# Patient Record
Sex: MALE | Race: WHITE | NOT HISPANIC OR LATINO | Employment: OTHER | URBAN - METROPOLITAN AREA
[De-identification: names, ages, dates, MRNs, and addresses within clinical notes are randomized per-mention and may not be internally consistent; named-entity substitution may affect disease eponyms.]

---

## 2018-01-22 ENCOUNTER — CONVERSION ENCOUNTER (OUTPATIENT)
Dept: CARDIOLOGY | Facility: CLINIC | Age: 78
End: 2018-01-22
Attending: INTERNAL MEDICINE

## 2018-01-22 ENCOUNTER — CONVERSION ENCOUNTER (OUTPATIENT)
Dept: CARDIOLOGY | Facility: CLINIC | Age: 78
End: 2018-01-22

## 2018-06-06 ENCOUNTER — OFFICE VISIT CONVERTED (OUTPATIENT)
Dept: CARDIOLOGY | Facility: CLINIC | Age: 78
End: 2018-06-06
Attending: INTERNAL MEDICINE

## 2018-09-10 ENCOUNTER — OFFICE VISIT CONVERTED (OUTPATIENT)
Dept: CARDIOLOGY | Facility: CLINIC | Age: 78
End: 2018-09-10
Attending: INTERNAL MEDICINE

## 2018-09-10 ENCOUNTER — CONVERSION ENCOUNTER (OUTPATIENT)
Dept: OTHER | Facility: HOSPITAL | Age: 78
End: 2018-09-10

## 2019-03-11 ENCOUNTER — OFFICE VISIT CONVERTED (OUTPATIENT)
Dept: CARDIOLOGY | Facility: CLINIC | Age: 79
End: 2019-03-11
Attending: INTERNAL MEDICINE

## 2019-09-23 ENCOUNTER — OFFICE VISIT CONVERTED (OUTPATIENT)
Dept: CARDIOLOGY | Facility: CLINIC | Age: 79
End: 2019-09-23
Attending: INTERNAL MEDICINE

## 2019-09-23 ENCOUNTER — CONVERSION ENCOUNTER (OUTPATIENT)
Dept: CARDIOLOGY | Facility: CLINIC | Age: 79
End: 2019-09-23

## 2020-04-30 ENCOUNTER — OFFICE VISIT CONVERTED (OUTPATIENT)
Dept: OTHER | Facility: HOSPITAL | Age: 80
End: 2020-04-30
Attending: INTERNAL MEDICINE

## 2020-05-06 ENCOUNTER — OFFICE VISIT CONVERTED (OUTPATIENT)
Dept: OTHER | Facility: HOSPITAL | Age: 80
End: 2020-05-06
Attending: INTERNAL MEDICINE

## 2020-05-08 ENCOUNTER — HOSPITAL ENCOUNTER (OUTPATIENT)
Dept: UROLOGY | Facility: CLINIC | Age: 80
Discharge: HOME | End: 2020-05-08
Attending: UROLOGY

## 2020-05-08 ENCOUNTER — OFFICE VISIT CONVERTED (OUTPATIENT)
Dept: UROLOGY | Facility: CLINIC | Age: 80
End: 2020-05-08
Attending: UROLOGY

## 2020-05-11 LAB
AMOXICILLIN+CLAV SUSC ISLT: 4
AMOXICILLIN+CLAV SUSC ISLT: 4
AMPICILLIN SUSC ISLT: >=32
AMPICILLIN SUSC ISLT: >=32
AMPICILLIN+SULBAC SUSC ISLT: 16
AMPICILLIN+SULBAC SUSC ISLT: >=32
BACTERIA UR CULT: ABNORMAL
CEFAZOLIN SUSC ISLT: >=64
CEFAZOLIN SUSC ISLT: >=64
CEFEPIME SUSC ISLT: 2
CEFEPIME SUSC ISLT: 8
CEFTAZIDIME SUSC ISLT: 4
CEFTAZIDIME SUSC ISLT: 4
CEFTRIAXONE SUSC ISLT: >=64
CEFTRIAXONE SUSC ISLT: >=64
CEFUROXIME ORAL SUSC ISLT: >=64
CEFUROXIME ORAL SUSC ISLT: >=64
CEFUROXIME PARENTER SUSC ISLT: >=64
CEFUROXIME PARENTER SUSC ISLT: >=64
CIPROFLOXACIN SUSC ISLT: >=4
CIPROFLOXACIN SUSC ISLT: >=4
CONV RECTAL SCREEN FOR FLUOROQUINOLONE RESISTANT ORGANISMS: ABNORMAL
ERTAPENEM SUSC ISLT: <=0.5
ERTAPENEM SUSC ISLT: <=0.5
GENTAMICIN SUSC ISLT: <=1
GENTAMICIN SUSC ISLT: >=16
LEVOFLOXACIN SUSC ISLT: >=8
LEVOFLOXACIN SUSC ISLT: >=8
NITROFURANTOIN SUSC ISLT: <=16
TETRACYCLINE SUSC ISLT: <=1
TETRACYCLINE SUSC ISLT: <=1
TMP SMX SUSC ISLT: <=20
TMP SMX SUSC ISLT: <=20
TOBRAMYCIN SUSC ISLT: <=1
TOBRAMYCIN SUSC ISLT: >=16

## 2020-05-21 ENCOUNTER — OFFICE VISIT CONVERTED (OUTPATIENT)
Dept: OTHER | Facility: HOSPITAL | Age: 80
End: 2020-05-21
Attending: INTERNAL MEDICINE

## 2020-05-22 ENCOUNTER — OFFICE VISIT CONVERTED (OUTPATIENT)
Dept: UROLOGY | Facility: CLINIC | Age: 80
End: 2020-05-22
Attending: UROLOGY

## 2020-05-26 ENCOUNTER — PROCEDURE VISIT CONVERTED (OUTPATIENT)
Dept: UROLOGY | Facility: CLINIC | Age: 80
End: 2020-05-26
Attending: UROLOGY

## 2020-05-26 ENCOUNTER — HOSPITAL ENCOUNTER (OUTPATIENT)
Dept: SURGERY | Facility: CLINIC | Age: 80
Discharge: HOME | End: 2020-05-26
Attending: UROLOGY

## 2020-06-11 ENCOUNTER — OFFICE VISIT CONVERTED (OUTPATIENT)
Dept: OTHER | Facility: HOSPITAL | Age: 80
End: 2020-06-11
Attending: INTERNAL MEDICINE

## 2020-06-18 ENCOUNTER — OFFICE VISIT CONVERTED (OUTPATIENT)
Dept: OTHER | Facility: HOSPITAL | Age: 80
End: 2020-06-18
Attending: INTERNAL MEDICINE

## 2020-07-16 ENCOUNTER — OFFICE VISIT CONVERTED (OUTPATIENT)
Dept: OTHER | Facility: HOSPITAL | Age: 80
End: 2020-07-16
Attending: INTERNAL MEDICINE

## 2020-08-13 ENCOUNTER — OFFICE VISIT CONVERTED (OUTPATIENT)
Dept: OTHER | Facility: HOSPITAL | Age: 80
End: 2020-08-13
Attending: INTERNAL MEDICINE

## 2020-12-11 ENCOUNTER — CONVERSION ENCOUNTER (OUTPATIENT)
Dept: CARDIOLOGY | Facility: CLINIC | Age: 80
End: 2020-12-11

## 2020-12-11 ENCOUNTER — OFFICE VISIT CONVERTED (OUTPATIENT)
Dept: CARDIOLOGY | Facility: CLINIC | Age: 80
End: 2020-12-11
Attending: NURSE PRACTITIONER

## 2021-01-01 ENCOUNTER — HOSPITAL ENCOUNTER (OUTPATIENT)
Dept: ONCOLOGY | Facility: HOSPITAL | Age: 81
Setting detail: INFUSION SERIES
Discharge: HOME | End: 2021-08-02

## 2021-01-01 ENCOUNTER — HOSPITAL ENCOUNTER (EMERGENCY)
Facility: HOSPITAL | Age: 81
Discharge: LEFT BEFORE DISCHARGE | End: 2021-12-28
Attending: EMERGENCY MEDICINE | Admitting: EMERGENCY MEDICINE

## 2021-01-01 ENCOUNTER — APPOINTMENT (OUTPATIENT)
Dept: CT IMAGING | Facility: HOSPITAL | Age: 81
End: 2021-01-01

## 2021-01-01 ENCOUNTER — LAB (OUTPATIENT)
Dept: ONCOLOGY | Facility: HOSPITAL | Age: 81
End: 2021-01-01

## 2021-01-01 ENCOUNTER — HOSPITAL ENCOUNTER (OUTPATIENT)
Dept: CT IMAGING | Facility: HOSPITAL | Age: 81
Discharge: HOME | End: 2021-10-11
Admitting: INTERNAL MEDICINE

## 2021-01-01 ENCOUNTER — HOSPITAL ENCOUNTER (OUTPATIENT)
Dept: NUCLEAR MEDICINE | Facility: HOSPITAL | Age: 81
Discharge: HOME | End: 2021-08-17

## 2021-01-01 ENCOUNTER — OFFICE VISIT (OUTPATIENT)
Dept: CARDIOLOGY | Facility: CLINIC | Age: 81
End: 2021-01-01

## 2021-01-01 ENCOUNTER — HOSPITAL ENCOUNTER (OUTPATIENT)
Dept: ONCOLOGY | Facility: HOSPITAL | Age: 81
Setting detail: INFUSION SERIES
Discharge: HOME | End: 2021-10-25

## 2021-01-01 ENCOUNTER — HOSPITAL ENCOUNTER (OUTPATIENT)
Dept: ONCOLOGY | Facility: HOSPITAL | Age: 81
Setting detail: INFUSION SERIES
Discharge: HOME | End: 2021-12-20

## 2021-01-01 ENCOUNTER — HOSPITAL ENCOUNTER (OUTPATIENT)
Dept: ONCOLOGY | Facility: HOSPITAL | Age: 81
Setting detail: INFUSION SERIES
Discharge: HOME | End: 2021-09-27

## 2021-01-01 ENCOUNTER — OFFICE VISIT (OUTPATIENT)
Dept: ONCOLOGY | Facility: HOSPITAL | Age: 81
End: 2021-01-01

## 2021-01-01 ENCOUNTER — TELEPHONE (OUTPATIENT)
Dept: ONCOLOGY | Facility: HOSPITAL | Age: 81
End: 2021-01-01

## 2021-01-01 ENCOUNTER — HOSPITAL ENCOUNTER (OUTPATIENT)
Dept: ONCOLOGY | Facility: HOSPITAL | Age: 81
Setting detail: INFUSION SERIES
Discharge: HOME | End: 2021-08-30

## 2021-01-01 ENCOUNTER — HOSPITAL ENCOUNTER (OUTPATIENT)
Dept: ONCOLOGY | Facility: HOSPITAL | Age: 81
Setting detail: INFUSION SERIES
Discharge: HOME | End: 2021-11-22

## 2021-01-01 ENCOUNTER — APPOINTMENT (OUTPATIENT)
Dept: GENERAL RADIOLOGY | Facility: HOSPITAL | Age: 81
End: 2021-01-01

## 2021-01-01 ENCOUNTER — APPOINTMENT (OUTPATIENT)
Dept: ONCOLOGY | Facility: HOSPITAL | Age: 81
End: 2021-01-01

## 2021-01-01 ENCOUNTER — TELEPHONE (OUTPATIENT)
Dept: UROLOGY | Facility: CLINIC | Age: 81
End: 2021-01-01

## 2021-01-01 ENCOUNTER — HOSPITAL ENCOUNTER (OUTPATIENT)
Dept: CT IMAGING | Facility: HOSPITAL | Age: 81
Discharge: HOME | End: 2021-08-17

## 2021-01-01 ENCOUNTER — APPOINTMENT (OUTPATIENT)
Dept: LAB | Facility: HOSPITAL | Age: 81
End: 2021-01-01

## 2021-01-01 VITALS
RESPIRATION RATE: 18 BRPM | TEMPERATURE: 97.4 F | SYSTOLIC BLOOD PRESSURE: 128 MMHG | HEART RATE: 100 BPM | DIASTOLIC BLOOD PRESSURE: 78 MMHG

## 2021-01-01 VITALS
BODY MASS INDEX: 23.1 KG/M2 | DIASTOLIC BLOOD PRESSURE: 72 MMHG | WEIGHT: 180 LBS | HEART RATE: 78 BPM | TEMPERATURE: 98.4 F | HEIGHT: 74 IN | SYSTOLIC BLOOD PRESSURE: 123 MMHG

## 2021-01-01 VITALS
HEIGHT: 74 IN | TEMPERATURE: 97.1 F | RESPIRATION RATE: 18 BRPM | BODY MASS INDEX: 27.3 KG/M2 | HEART RATE: 83 BPM | WEIGHT: 212.74 LBS | DIASTOLIC BLOOD PRESSURE: 77 MMHG | SYSTOLIC BLOOD PRESSURE: 111 MMHG | OXYGEN SATURATION: 100 %

## 2021-01-01 VITALS
TEMPERATURE: 98.2 F | DIASTOLIC BLOOD PRESSURE: 69 MMHG | RESPIRATION RATE: 18 BRPM | HEART RATE: 80 BPM | RESPIRATION RATE: 18 BRPM | RESPIRATION RATE: 18 BRPM | BODY MASS INDEX: 27.79 KG/M2 | OXYGEN SATURATION: 98 % | WEIGHT: 201.8 LBS | OXYGEN SATURATION: 98 % | HEIGHT: 74 IN | HEART RATE: 72 BPM | SYSTOLIC BLOOD PRESSURE: 112 MMHG | BODY MASS INDEX: 26.08 KG/M2 | DIASTOLIC BLOOD PRESSURE: 65 MMHG | DIASTOLIC BLOOD PRESSURE: 70 MMHG | OXYGEN SATURATION: 97 % | RESPIRATION RATE: 20 BRPM | HEART RATE: 84 BPM | TEMPERATURE: 98.9 F | TEMPERATURE: 96.9 F | WEIGHT: 205.2 LBS | RESPIRATION RATE: 20 BRPM | WEIGHT: 205.7 LBS | BODY MASS INDEX: 26.34 KG/M2 | OXYGEN SATURATION: 98 % | WEIGHT: 205.2 LBS | HEIGHT: 74 IN | HEART RATE: 81 BPM | HEIGHT: 74 IN | HEIGHT: 74 IN | HEIGHT: 74 IN | BODY MASS INDEX: 26.4 KG/M2 | OXYGEN SATURATION: 98 % | SYSTOLIC BLOOD PRESSURE: 112 MMHG | SYSTOLIC BLOOD PRESSURE: 124 MMHG | BODY MASS INDEX: 26.14 KG/M2 | TEMPERATURE: 97.4 F | HEART RATE: 70 BPM | HEART RATE: 82 BPM | BODY MASS INDEX: 25.9 KG/M2 | WEIGHT: 203.2 LBS | DIASTOLIC BLOOD PRESSURE: 62 MMHG | SYSTOLIC BLOOD PRESSURE: 118 MMHG | TEMPERATURE: 96.3 F | TEMPERATURE: 97 F | DIASTOLIC BLOOD PRESSURE: 64 MMHG | OXYGEN SATURATION: 99 % | DIASTOLIC BLOOD PRESSURE: 62 MMHG | SYSTOLIC BLOOD PRESSURE: 96 MMHG | HEIGHT: 72 IN | WEIGHT: 203.7 LBS | SYSTOLIC BLOOD PRESSURE: 105 MMHG

## 2021-01-01 VITALS
HEART RATE: 49 BPM | DIASTOLIC BLOOD PRESSURE: 64 MMHG | TEMPERATURE: 97.2 F | OXYGEN SATURATION: 99 % | SYSTOLIC BLOOD PRESSURE: 117 MMHG | RESPIRATION RATE: 18 BRPM

## 2021-01-01 VITALS
SYSTOLIC BLOOD PRESSURE: 80 MMHG | HEIGHT: 74 IN | BODY MASS INDEX: 25.28 KG/M2 | WEIGHT: 197 LBS | DIASTOLIC BLOOD PRESSURE: 54 MMHG | HEART RATE: 80 BPM

## 2021-01-01 VITALS — RESPIRATION RATE: 17 BRPM | WEIGHT: 206.25 LBS | BODY MASS INDEX: 25.11 KG/M2 | HEIGHT: 76 IN

## 2021-01-01 VITALS
OXYGEN SATURATION: 98 % | RESPIRATION RATE: 18 BRPM | HEART RATE: 72 BPM | DIASTOLIC BLOOD PRESSURE: 68 MMHG | SYSTOLIC BLOOD PRESSURE: 114 MMHG | TEMPERATURE: 96.7 F

## 2021-01-01 VITALS
OXYGEN SATURATION: 99 % | HEART RATE: 84 BPM | DIASTOLIC BLOOD PRESSURE: 75 MMHG | WEIGHT: 217.15 LBS | TEMPERATURE: 97.5 F | BODY MASS INDEX: 27.88 KG/M2 | RESPIRATION RATE: 18 BRPM | SYSTOLIC BLOOD PRESSURE: 126 MMHG

## 2021-01-01 VITALS
OXYGEN SATURATION: 91 % | HEART RATE: 86 BPM | DIASTOLIC BLOOD PRESSURE: 81 MMHG | SYSTOLIC BLOOD PRESSURE: 107 MMHG | BODY MASS INDEX: 25.67 KG/M2 | HEIGHT: 74 IN | RESPIRATION RATE: 20 BRPM | WEIGHT: 200 LBS | TEMPERATURE: 97.9 F

## 2021-01-01 VITALS
WEIGHT: 212.08 LBS | OXYGEN SATURATION: 99 % | HEART RATE: 87 BPM | TEMPERATURE: 98.7 F | RESPIRATION RATE: 18 BRPM | SYSTOLIC BLOOD PRESSURE: 103 MMHG | DIASTOLIC BLOOD PRESSURE: 71 MMHG | BODY MASS INDEX: 27.23 KG/M2

## 2021-01-01 VITALS
HEIGHT: 74 IN | HEART RATE: 80 BPM | DIASTOLIC BLOOD PRESSURE: 88 MMHG | SYSTOLIC BLOOD PRESSURE: 134 MMHG | WEIGHT: 202 LBS | BODY MASS INDEX: 25.93 KG/M2

## 2021-01-01 VITALS
WEIGHT: 204 LBS | HEIGHT: 74 IN | SYSTOLIC BLOOD PRESSURE: 110 MMHG | HEART RATE: 78 BPM | DIASTOLIC BLOOD PRESSURE: 74 MMHG | BODY MASS INDEX: 26.18 KG/M2

## 2021-01-01 VITALS
SYSTOLIC BLOOD PRESSURE: 104 MMHG | WEIGHT: 210 LBS | DIASTOLIC BLOOD PRESSURE: 64 MMHG | BODY MASS INDEX: 26.95 KG/M2 | HEART RATE: 78 BPM | HEIGHT: 74 IN

## 2021-01-01 VITALS
HEART RATE: 76 BPM | HEIGHT: 74 IN | SYSTOLIC BLOOD PRESSURE: 120 MMHG | DIASTOLIC BLOOD PRESSURE: 80 MMHG | BODY MASS INDEX: 26.44 KG/M2 | WEIGHT: 206 LBS

## 2021-01-01 VITALS
SYSTOLIC BLOOD PRESSURE: 120 MMHG | HEIGHT: 74 IN | DIASTOLIC BLOOD PRESSURE: 74 MMHG | WEIGHT: 203 LBS | HEART RATE: 76 BPM | BODY MASS INDEX: 26.05 KG/M2

## 2021-01-01 VITALS
HEIGHT: 74 IN | WEIGHT: 215 LBS | SYSTOLIC BLOOD PRESSURE: 100 MMHG | BODY MASS INDEX: 27.59 KG/M2 | HEART RATE: 80 BPM | DIASTOLIC BLOOD PRESSURE: 74 MMHG

## 2021-01-01 DIAGNOSIS — C61 PRIMARY MALIGNANT NEOPLASM OF PROSTATE METASTATIC TO BONE (HCC): Primary | ICD-10-CM

## 2021-01-01 DIAGNOSIS — C61 PRIMARY MALIGNANT NEOPLASM OF PROSTATE METASTATIC TO BONE (HCC): ICD-10-CM

## 2021-01-01 DIAGNOSIS — C79.51 PRIMARY MALIGNANT NEOPLASM OF PROSTATE METASTATIC TO BONE (HCC): ICD-10-CM

## 2021-01-01 DIAGNOSIS — J18.9 PNEUMONIA OF RIGHT LUNG DUE TO INFECTIOUS ORGANISM, UNSPECIFIED PART OF LUNG: Primary | ICD-10-CM

## 2021-01-01 DIAGNOSIS — C79.51 PRIMARY MALIGNANT NEOPLASM OF PROSTATE METASTATIC TO BONE (HCC): Primary | ICD-10-CM

## 2021-01-01 DIAGNOSIS — N18.9 ACUTE ON CHRONIC RENAL INSUFFICIENCY: ICD-10-CM

## 2021-01-01 DIAGNOSIS — I25.810 CORONARY ARTERY DISEASE INVOLVING CORONARY BYPASS GRAFT OF NATIVE HEART WITHOUT ANGINA PECTORIS: Primary | Chronic | ICD-10-CM

## 2021-01-01 DIAGNOSIS — I50.42 CHRONIC COMBINED SYSTOLIC AND DIASTOLIC CONGESTIVE HEART FAILURE (HCC): Chronic | ICD-10-CM

## 2021-01-01 DIAGNOSIS — E86.0 DEHYDRATION: ICD-10-CM

## 2021-01-01 DIAGNOSIS — N18.31 STAGE 3A CHRONIC KIDNEY DISEASE (HCC): ICD-10-CM

## 2021-01-01 DIAGNOSIS — N28.9 ACUTE ON CHRONIC RENAL INSUFFICIENCY: ICD-10-CM

## 2021-01-01 DIAGNOSIS — I10 HYPERTENSION, ESSENTIAL: ICD-10-CM

## 2021-01-01 DIAGNOSIS — G89.3 CANCER ASSOCIATED PAIN: Primary | ICD-10-CM

## 2021-01-01 DIAGNOSIS — I42.9 CARDIOMYOPATHY, UNSPECIFIED TYPE (HCC): ICD-10-CM

## 2021-01-01 DIAGNOSIS — J96.01 ACUTE RESPIRATORY FAILURE WITH HYPOXIA: ICD-10-CM

## 2021-01-01 DIAGNOSIS — R89.9 ABNORMAL LABORATORY TEST: ICD-10-CM

## 2021-01-01 DIAGNOSIS — E78.5 HYPERLIPIDEMIA, UNSPECIFIED HYPERLIPIDEMIA TYPE: ICD-10-CM

## 2021-01-01 LAB
ALBUMIN SERPL-MCNC: 4.5 G/DL (ref 3.5–5.2)
ALBUMIN SERPL-MCNC: 4.59 G/DL (ref 3.5–5.2)
ALBUMIN SERPL-MCNC: 4.6 G/DL (ref 3.5–5.2)
ALBUMIN SERPL-MCNC: 4.71 G/DL (ref 3.5–5.2)
ALBUMIN SERPL-MCNC: 4.72 G/DL (ref 3.5–5.2)
ALBUMIN SERPL-MCNC: 4.75 G/DL (ref 3.5–5.2)
ALBUMIN SERPL-MCNC: 4.87 G/DL (ref 3.5–5.2)
ALBUMIN/GLOB SERPL: 1.6 G/DL
ALBUMIN/GLOB SERPL: 1.8 G/DL
ALBUMIN/GLOB SERPL: 2.2 G/DL
ALBUMIN/GLOB SERPL: 2.2 G/DL
ALBUMIN/GLOB SERPL: 2.3 G/DL
ALBUMIN/GLOB SERPL: 2.3 G/DL
ALBUMIN/GLOB SERPL: 2.4 G/DL
ALP SERPL-CCNC: 34 U/L (ref 39–117)
ALP SERPL-CCNC: 35 U/L (ref 39–117)
ALP SERPL-CCNC: 35 U/L (ref 39–117)
ALP SERPL-CCNC: 36 U/L (ref 39–117)
ALP SERPL-CCNC: 39 U/L (ref 39–117)
ALP SERPL-CCNC: 45 U/L (ref 39–117)
ALP SERPL-CCNC: 46 U/L (ref 39–117)
ALT SERPL W P-5'-P-CCNC: 10 U/L (ref 1–41)
ALT SERPL W P-5'-P-CCNC: 18 U/L (ref 1–41)
ALT SERPL W P-5'-P-CCNC: 6 U/L (ref 1–41)
ALT SERPL W P-5'-P-CCNC: 7 U/L (ref 1–41)
ALT SERPL W P-5'-P-CCNC: 8 U/L (ref 1–41)
ALT SERPL W P-5'-P-CCNC: 8 U/L (ref 1–41)
ALT SERPL W P-5'-P-CCNC: 9 U/L (ref 1–41)
ANION GAP SERPL CALCULATED.3IONS-SCNC: 11.3 MMOL/L (ref 5–15)
ANION GAP SERPL CALCULATED.3IONS-SCNC: 11.8 MMOL/L (ref 5–15)
ANION GAP SERPL CALCULATED.3IONS-SCNC: 14.7 MMOL/L (ref 5–15)
ANION GAP SERPL CALCULATED.3IONS-SCNC: 16.2 MMOL/L (ref 5–15)
ANION GAP SERPL CALCULATED.3IONS-SCNC: 8.8 MMOL/L (ref 5–15)
ANION GAP SERPL CALCULATED.3IONS-SCNC: 9 MMOL/L (ref 5–15)
ANION GAP SERPL CALCULATED.3IONS-SCNC: 9.7 MMOL/L (ref 5–15)
APTT PPP: 26.3 SECONDS (ref 22.2–34.2)
ARTERIAL PATENCY WRIST A: POSITIVE
AST SERPL-CCNC: 13 U/L (ref 1–40)
AST SERPL-CCNC: 14 U/L (ref 1–40)
AST SERPL-CCNC: 14 U/L (ref 1–40)
AST SERPL-CCNC: 16 U/L (ref 1–40)
AST SERPL-CCNC: 24 U/L (ref 1–40)
BASE EXCESS BLDA CALC-SCNC: -1.8 MMOL/L (ref -2–2)
BASOPHILS # BLD AUTO: 0.02 10*3/MM3 (ref 0–0.2)
BASOPHILS # BLD AUTO: 0.02 10*3/MM3 (ref 0–0.2)
BASOPHILS # BLD AUTO: 0.03 10*3/MM3 (ref 0–0.2)
BASOPHILS # BLD AUTO: 0.03 10*3/MM3 (ref 0–0.2)
BASOPHILS # BLD AUTO: 0.04 10*3/MM3 (ref 0–0.2)
BASOPHILS # BLD AUTO: 0.04 10*3/MM3 (ref 0–0.2)
BASOPHILS # BLD AUTO: 0.05 10*3/MM3 (ref 0–0.2)
BASOPHILS NFR BLD AUTO: 0.3 % (ref 0–1.5)
BASOPHILS NFR BLD AUTO: 0.3 % (ref 0–1.5)
BASOPHILS NFR BLD AUTO: 0.4 % (ref 0–1.5)
BASOPHILS NFR BLD AUTO: 0.4 % (ref 0–1.5)
BASOPHILS NFR BLD AUTO: 0.6 % (ref 0–1.5)
BDY SITE: ABNORMAL
BILIRUB SERPL-MCNC: 0.7 MG/DL (ref 0–1.2)
BILIRUB SERPL-MCNC: 0.8 MG/DL (ref 0–1.2)
BILIRUB SERPL-MCNC: 0.8 MG/DL (ref 0–1.2)
BILIRUB SERPL-MCNC: 0.9 MG/DL (ref 0–1.2)
BILIRUB SERPL-MCNC: 1.2 MG/DL (ref 0–1.2)
BUN SERPL-MCNC: 15 MG/DL (ref 8–23)
BUN SERPL-MCNC: 18 MG/DL (ref 8–23)
BUN SERPL-MCNC: 22 MG/DL (ref 8–23)
BUN SERPL-MCNC: 24 MG/DL (ref 8–23)
BUN SERPL-MCNC: 24 MG/DL (ref 8–23)
BUN SERPL-MCNC: 26 MG/DL (ref 8–23)
BUN SERPL-MCNC: 28 MG/DL (ref 8–23)
BUN/CREAT SERPL: 10.8 (ref 7–25)
BUN/CREAT SERPL: 12.6 (ref 7–25)
BUN/CREAT SERPL: 12.8 (ref 7–25)
BUN/CREAT SERPL: 12.9 (ref 7–25)
BUN/CREAT SERPL: 16.9 (ref 7–25)
BUN/CREAT SERPL: 18.5 (ref 7–25)
BUN/CREAT SERPL: 18.6 (ref 7–25)
CALCIUM SPEC-SCNC: 10.1 MG/DL (ref 8.6–10.5)
CALCIUM SPEC-SCNC: 9 MG/DL (ref 8.6–10.5)
CALCIUM SPEC-SCNC: 9.2 MG/DL (ref 8.6–10.5)
CALCIUM SPEC-SCNC: 9.5 MG/DL (ref 8.6–10.5)
CALCIUM SPEC-SCNC: 9.5 MG/DL (ref 8.6–10.5)
CALCIUM SPEC-SCNC: 9.7 MG/DL (ref 8.6–10.5)
CALCIUM SPEC-SCNC: 9.7 MG/DL (ref 8.6–10.5)
CHLORIDE SERPL-SCNC: 103 MMOL/L (ref 98–107)
CHLORIDE SERPL-SCNC: 103 MMOL/L (ref 98–107)
CHLORIDE SERPL-SCNC: 104 MMOL/L (ref 98–107)
CHLORIDE SERPL-SCNC: 104 MMOL/L (ref 98–107)
CHLORIDE SERPL-SCNC: 105 MMOL/L (ref 98–107)
CHLORIDE SERPL-SCNC: 105 MMOL/L (ref 98–107)
CHLORIDE SERPL-SCNC: 98 MMOL/L (ref 98–107)
CO2 SERPL-SCNC: 22.2 MMOL/L (ref 22–29)
CO2 SERPL-SCNC: 22.3 MMOL/L (ref 22–29)
CO2 SERPL-SCNC: 22.7 MMOL/L (ref 22–29)
CO2 SERPL-SCNC: 23.3 MMOL/L (ref 22–29)
CO2 SERPL-SCNC: 23.8 MMOL/L (ref 22–29)
CO2 SERPL-SCNC: 25 MMOL/L (ref 22–29)
CO2 SERPL-SCNC: 27.2 MMOL/L (ref 22–29)
COHGB MFR BLD: 0.5 % (ref 0–1.5)
CREAT SERPL-MCNC: 1.29 MG/DL (ref 0.76–1.27)
CREAT SERPL-MCNC: 1.39 MG/DL (ref 0.76–1.27)
CREAT SERPL-MCNC: 1.4 MG/DL (ref 0.76–1.27)
CREAT SERPL-MCNC: 1.51 MG/DL (ref 0.76–1.27)
CREAT SERPL-MCNC: 1.54 MG/DL (ref 0.76–1.27)
CREAT SERPL-MCNC: 1.72 MG/DL (ref 0.76–1.27)
CREAT SERPL-MCNC: 1.91 MG/DL (ref 0.76–1.27)
D-LACTATE SERPL-SCNC: 1.4 MMOL/L (ref 0.5–2)
DEPRECATED RDW RBC AUTO: 47.9 FL (ref 37–54)
DEPRECATED RDW RBC AUTO: 48.5 FL (ref 37–54)
DEPRECATED RDW RBC AUTO: 50.1 FL (ref 37–54)
DEPRECATED RDW RBC AUTO: 51.7 FL (ref 37–54)
DEPRECATED RDW RBC AUTO: 52.1 FL (ref 37–54)
DEPRECATED RDW RBC AUTO: 52.8 FL (ref 37–54)
DEPRECATED RDW RBC AUTO: 53.4 FL (ref 37–54)
EOSINOPHIL # BLD AUTO: 0.12 10*3/MM3 (ref 0–0.4)
EOSINOPHIL # BLD AUTO: 0.13 10*3/MM3 (ref 0–0.4)
EOSINOPHIL # BLD AUTO: 0.2 10*3/MM3 (ref 0–0.4)
EOSINOPHIL # BLD AUTO: 0.2 10*3/MM3 (ref 0–0.4)
EOSINOPHIL # BLD AUTO: 0.23 10*3/MM3 (ref 0–0.4)
EOSINOPHIL # BLD AUTO: 0.24 10*3/MM3 (ref 0–0.4)
EOSINOPHIL # BLD AUTO: 0.26 10*3/MM3 (ref 0–0.4)
EOSINOPHIL NFR BLD AUTO: 1.5 % (ref 0.3–6.2)
EOSINOPHIL NFR BLD AUTO: 1.7 % (ref 0.3–6.2)
EOSINOPHIL NFR BLD AUTO: 2.9 % (ref 0.3–6.2)
EOSINOPHIL NFR BLD AUTO: 3.2 % (ref 0.3–6.2)
EOSINOPHIL NFR BLD AUTO: 3.6 % (ref 0.3–6.2)
EOSINOPHIL NFR BLD AUTO: 3.7 % (ref 0.3–6.2)
EOSINOPHIL NFR BLD AUTO: 3.8 % (ref 0.3–6.2)
ERYTHROCYTE [DISTWIDTH] IN BLOOD BY AUTOMATED COUNT: 14.2 % (ref 12.3–15.4)
ERYTHROCYTE [DISTWIDTH] IN BLOOD BY AUTOMATED COUNT: 14.9 % (ref 12.3–15.4)
ERYTHROCYTE [DISTWIDTH] IN BLOOD BY AUTOMATED COUNT: 15 % (ref 12.3–15.4)
ERYTHROCYTE [DISTWIDTH] IN BLOOD BY AUTOMATED COUNT: 15.8 % (ref 12.3–15.4)
ERYTHROCYTE [DISTWIDTH] IN BLOOD BY AUTOMATED COUNT: 15.9 % (ref 12.3–15.4)
ERYTHROCYTE [DISTWIDTH] IN BLOOD BY AUTOMATED COUNT: 16.3 % (ref 12.3–15.4)
ERYTHROCYTE [DISTWIDTH] IN BLOOD BY AUTOMATED COUNT: 16.8 % (ref 12.3–15.4)
FHHB: 2 % (ref 0–5)
FLUAV AG NPH QL: NEGATIVE
FLUBV AG NPH QL IA: NEGATIVE
GAS FLOW AIRWAY: 2 LPM
GFR SERPL CREATININE-BSD FRML MDRD: 34 ML/MIN/1.73
GFR SERPL CREATININE-BSD FRML MDRD: 38 ML/MIN/1.73
GFR SERPL CREATININE-BSD FRML MDRD: 44 ML/MIN/1.73
GFR SERPL CREATININE-BSD FRML MDRD: 45 ML/MIN/1.73
GFR SERPL CREATININE-BSD FRML MDRD: 49 ML/MIN/1.73
GFR SERPL CREATININE-BSD FRML MDRD: 49 ML/MIN/1.73
GFR SERPL CREATININE-BSD FRML MDRD: 54 ML/MIN/1.73
GLOBULIN UR ELPH-MCNC: 2 GM/DL
GLOBULIN UR ELPH-MCNC: 2.2 GM/DL
GLOBULIN UR ELPH-MCNC: 2.2 GM/DL
GLOBULIN UR ELPH-MCNC: 2.6 GM/DL
GLOBULIN UR ELPH-MCNC: 2.8 GM/DL
GLUCOSE SERPL-MCNC: 102 MG/DL (ref 65–99)
GLUCOSE SERPL-MCNC: 102 MG/DL (ref 65–99)
GLUCOSE SERPL-MCNC: 107 MG/DL (ref 65–99)
GLUCOSE SERPL-MCNC: 109 MG/DL (ref 65–99)
GLUCOSE SERPL-MCNC: 127 MG/DL (ref 65–99)
HCO3 BLDA-SCNC: 21.7 MMOL/L (ref 22–26)
HCT VFR BLD AUTO: 36.7 % (ref 37.5–51)
HCT VFR BLD AUTO: 37 % (ref 37.5–51)
HCT VFR BLD AUTO: 37.6 % (ref 37.5–51)
HCT VFR BLD AUTO: 38.5 % (ref 37.5–51)
HCT VFR BLD AUTO: 39.5 % (ref 37.5–51)
HCT VFR BLD AUTO: 40.6 % (ref 37.5–51)
HCT VFR BLD AUTO: 40.9 % (ref 37.5–51)
HGB BLD-MCNC: 11.8 G/DL (ref 13–17.7)
HGB BLD-MCNC: 11.9 G/DL (ref 13–17.7)
HGB BLD-MCNC: 12 G/DL (ref 13–17.7)
HGB BLD-MCNC: 12.2 G/DL (ref 13–17.7)
HGB BLD-MCNC: 12.9 G/DL (ref 13–17.7)
HGB BLD-MCNC: 13 G/DL (ref 13–17.7)
HGB BLD-MCNC: 13.1 G/DL (ref 13–17.7)
HGB BLDA-MCNC: 13.1 G/DL (ref 13.8–16.4)
HOLD SPECIMEN: NORMAL
HOLD SPECIMEN: NORMAL
IMM GRANULOCYTES # BLD AUTO: 0.01 10*3/MM3 (ref 0–0.05)
IMM GRANULOCYTES # BLD AUTO: 0.02 10*3/MM3 (ref 0–0.05)
IMM GRANULOCYTES # BLD AUTO: 0.03 10*3/MM3 (ref 0–0.05)
IMM GRANULOCYTES # BLD AUTO: 0.03 10*3/MM3 (ref 0–0.05)
IMM GRANULOCYTES NFR BLD AUTO: 0.1 % (ref 0–0.5)
IMM GRANULOCYTES NFR BLD AUTO: 0.2 % (ref 0–0.5)
IMM GRANULOCYTES NFR BLD AUTO: 0.3 % (ref 0–0.5)
IMM GRANULOCYTES NFR BLD AUTO: 0.3 % (ref 0–0.5)
IMM GRANULOCYTES NFR BLD AUTO: 0.4 % (ref 0–0.5)
INHALED O2 CONCENTRATION: 28 %
INR PPP: 1.22 (ref 2–3)
LYMPHOCYTES # BLD AUTO: 1.28 10*3/MM3 (ref 0.7–3.1)
LYMPHOCYTES # BLD AUTO: 1.31 10*3/MM3 (ref 0.7–3.1)
LYMPHOCYTES # BLD AUTO: 1.34 10*3/MM3 (ref 0.7–3.1)
LYMPHOCYTES # BLD AUTO: 1.35 10*3/MM3 (ref 0.7–3.1)
LYMPHOCYTES # BLD AUTO: 1.38 10*3/MM3 (ref 0.7–3.1)
LYMPHOCYTES # BLD AUTO: 1.44 10*3/MM3 (ref 0.7–3.1)
LYMPHOCYTES # BLD AUTO: 1.6 10*3/MM3 (ref 0.7–3.1)
LYMPHOCYTES NFR BLD AUTO: 18.2 % (ref 19.6–45.3)
LYMPHOCYTES NFR BLD AUTO: 18.4 % (ref 19.6–45.3)
LYMPHOCYTES NFR BLD AUTO: 18.8 % (ref 19.6–45.3)
LYMPHOCYTES NFR BLD AUTO: 20.5 % (ref 19.6–45.3)
LYMPHOCYTES NFR BLD AUTO: 20.9 % (ref 19.6–45.3)
LYMPHOCYTES NFR BLD AUTO: 21.3 % (ref 19.6–45.3)
LYMPHOCYTES NFR BLD AUTO: 21.6 % (ref 19.6–45.3)
MAGNESIUM SERPL-MCNC: 2.2 MG/DL (ref 1.6–2.4)
MAGNESIUM SERPL-MCNC: 2.3 MG/DL (ref 1.6–2.4)
MAGNESIUM SERPL-MCNC: 2.3 MG/DL (ref 1.6–2.4)
MAGNESIUM SERPL-MCNC: 2.4 MG/DL (ref 1.6–2.4)
MAGNESIUM SERPL-MCNC: 2.4 MG/DL (ref 1.6–2.4)
MAGNESIUM SERPL-MCNC: 2.5 MG/DL (ref 1.6–2.4)
MAGNESIUM SERPL-MCNC: 2.5 MG/DL (ref 1.6–2.4)
MCH RBC QN AUTO: 28.1 PG (ref 26.6–33)
MCH RBC QN AUTO: 28.2 PG (ref 26.6–33)
MCH RBC QN AUTO: 28.5 PG (ref 26.6–33)
MCH RBC QN AUTO: 28.7 PG (ref 26.6–33)
MCH RBC QN AUTO: 28.9 PG (ref 26.6–33)
MCH RBC QN AUTO: 29.1 PG (ref 26.6–33)
MCH RBC QN AUTO: 29.4 PG (ref 26.6–33)
MCHC RBC AUTO-ENTMCNC: 31.5 G/DL (ref 31.5–35.7)
MCHC RBC AUTO-ENTMCNC: 31.6 G/DL (ref 31.5–35.7)
MCHC RBC AUTO-ENTMCNC: 31.7 G/DL (ref 31.5–35.7)
MCHC RBC AUTO-ENTMCNC: 32.2 G/DL (ref 31.5–35.7)
MCHC RBC AUTO-ENTMCNC: 32.3 G/DL (ref 31.5–35.7)
MCHC RBC AUTO-ENTMCNC: 32.4 G/DL (ref 31.5–35.7)
MCHC RBC AUTO-ENTMCNC: 32.9 G/DL (ref 31.5–35.7)
MCV RBC AUTO: 85.7 FL (ref 79–97)
MCV RBC AUTO: 88.7 FL (ref 79–97)
MCV RBC AUTO: 89.7 FL (ref 79–97)
MCV RBC AUTO: 89.8 FL (ref 79–97)
MCV RBC AUTO: 90.2 FL (ref 79–97)
MCV RBC AUTO: 90.9 FL (ref 79–97)
MCV RBC AUTO: 91.2 FL (ref 79–97)
METHGB BLD QL: 0.3 % (ref 0–1.5)
MODALITY: ABNORMAL
MONOCYTES # BLD AUTO: 0.61 10*3/MM3 (ref 0.1–0.9)
MONOCYTES # BLD AUTO: 0.62 10*3/MM3 (ref 0.1–0.9)
MONOCYTES # BLD AUTO: 0.68 10*3/MM3 (ref 0.1–0.9)
MONOCYTES # BLD AUTO: 0.69 10*3/MM3 (ref 0.1–0.9)
MONOCYTES # BLD AUTO: 0.77 10*3/MM3 (ref 0.1–0.9)
MONOCYTES # BLD AUTO: 0.83 10*3/MM3 (ref 0.1–0.9)
MONOCYTES # BLD AUTO: 1.07 10*3/MM3 (ref 0.1–0.9)
MONOCYTES NFR BLD AUTO: 10 % (ref 5–12)
MONOCYTES NFR BLD AUTO: 11.5 % (ref 5–12)
MONOCYTES NFR BLD AUTO: 11.9 % (ref 5–12)
MONOCYTES NFR BLD AUTO: 12.1 % (ref 5–12)
MONOCYTES NFR BLD AUTO: 9.6 % (ref 5–12)
MONOCYTES NFR BLD AUTO: 9.9 % (ref 5–12)
MONOCYTES NFR BLD AUTO: 9.9 % (ref 5–12)
NEUTROPHILS NFR BLD AUTO: 3.96 10*3/MM3 (ref 1.7–7)
NEUTROPHILS NFR BLD AUTO: 4.12 10*3/MM3 (ref 1.7–7)
NEUTROPHILS NFR BLD AUTO: 4.28 10*3/MM3 (ref 1.7–7)
NEUTROPHILS NFR BLD AUTO: 4.45 10*3/MM3 (ref 1.7–7)
NEUTROPHILS NFR BLD AUTO: 4.66 10*3/MM3 (ref 1.7–7)
NEUTROPHILS NFR BLD AUTO: 4.75 10*3/MM3 (ref 1.7–7)
NEUTROPHILS NFR BLD AUTO: 5.93 10*3/MM3 (ref 1.7–7)
NEUTROPHILS NFR BLD AUTO: 63.7 % (ref 42.7–76)
NEUTROPHILS NFR BLD AUTO: 64.1 % (ref 42.7–76)
NEUTROPHILS NFR BLD AUTO: 64.7 % (ref 42.7–76)
NEUTROPHILS NFR BLD AUTO: 65.1 % (ref 42.7–76)
NEUTROPHILS NFR BLD AUTO: 67.1 % (ref 42.7–76)
NEUTROPHILS NFR BLD AUTO: 67.3 % (ref 42.7–76)
NEUTROPHILS NFR BLD AUTO: 68.3 % (ref 42.7–76)
NRBC BLD AUTO-RTO: 0 /100 WBC (ref 0–0.2)
NT-PROBNP SERPL-MCNC: ABNORMAL PG/ML (ref 0–1800)
OXYHGB MFR BLDV: 97.2 % (ref 94–99)
PCO2 BLDA: 33.2 MM HG (ref 35–45)
PH BLDA: 7.43 PH UNITS (ref 7.35–7.45)
PHOSPHATE SERPL-MCNC: 2.4 MG/DL (ref 2.5–4.5)
PHOSPHATE SERPL-MCNC: 2.7 MG/DL (ref 2.5–4.5)
PHOSPHATE SERPL-MCNC: 2.8 MG/DL (ref 2.5–4.5)
PHOSPHATE SERPL-MCNC: 3.3 MG/DL (ref 2.5–4.5)
PHOSPHATE SERPL-MCNC: 3.6 MG/DL (ref 2.5–4.5)
PHOSPHATE SERPL-MCNC: 3.7 MG/DL (ref 2.5–4.5)
PLATELET # BLD AUTO: 162 10*3/MM3 (ref 140–450)
PLATELET # BLD AUTO: 163 10*3/MM3 (ref 140–450)
PLATELET # BLD AUTO: 165 10*3/MM3 (ref 140–450)
PLATELET # BLD AUTO: 167 10*3/MM3 (ref 140–450)
PLATELET # BLD AUTO: 168 10*3/MM3 (ref 140–450)
PLATELET # BLD AUTO: 169 10*3/MM3 (ref 140–450)
PLATELET # BLD AUTO: 178 10*3/MM3 (ref 140–450)
PMV BLD AUTO: 10.6 FL (ref 6–12)
PMV BLD AUTO: 10.8 FL (ref 6–12)
PMV BLD AUTO: 11 FL (ref 6–12)
PMV BLD AUTO: 11.2 FL (ref 6–12)
PMV BLD AUTO: 11.3 FL (ref 6–12)
PMV BLD AUTO: 11.3 FL (ref 6–12)
PMV BLD AUTO: 11.5 FL (ref 6–12)
PO2 BLD: 413 MM[HG] (ref 0–500)
PO2 BLDA: 115.7 MM HG (ref 80–100)
POTASSIUM SERPL-SCNC: 3.4 MMOL/L (ref 3.5–5.2)
POTASSIUM SERPL-SCNC: 3.7 MMOL/L (ref 3.5–5.2)
POTASSIUM SERPL-SCNC: 3.8 MMOL/L (ref 3.5–5.2)
POTASSIUM SERPL-SCNC: 3.9 MMOL/L (ref 3.5–5.2)
POTASSIUM SERPL-SCNC: 3.9 MMOL/L (ref 3.5–5.2)
POTASSIUM SERPL-SCNC: 4.1 MMOL/L (ref 3.5–5.2)
POTASSIUM SERPL-SCNC: 4.2 MMOL/L (ref 3.5–5.2)
PROT SERPL-MCNC: 6.6 G/DL (ref 6–8.5)
PROT SERPL-MCNC: 6.6 G/DL (ref 6–8.5)
PROT SERPL-MCNC: 6.7 G/DL (ref 6–8.5)
PROT SERPL-MCNC: 6.9 G/DL (ref 6–8.5)
PROT SERPL-MCNC: 7.1 G/DL (ref 6–8.5)
PROT SERPL-MCNC: 7.3 G/DL (ref 6–8.5)
PROT SERPL-MCNC: 7.3 G/DL (ref 6–8.5)
PROTHROMBIN TIME: 12.4 SECONDS (ref 9.4–12)
PSA SERPL-MCNC: 0.77 NG/ML (ref 0–4)
PSA SERPL-MCNC: 1.78 NG/ML (ref 0–4)
RBC # BLD AUTO: 4.09 10*6/MM3 (ref 4.14–5.8)
RBC # BLD AUTO: 4.12 10*6/MM3 (ref 4.14–5.8)
RBC # BLD AUTO: 4.17 10*6/MM3 (ref 4.14–5.8)
RBC # BLD AUTO: 4.34 10*6/MM3 (ref 4.14–5.8)
RBC # BLD AUTO: 4.45 10*6/MM3 (ref 4.14–5.8)
RBC # BLD AUTO: 4.5 10*6/MM3 (ref 4.14–5.8)
RBC # BLD AUTO: 4.61 10*6/MM3 (ref 4.14–5.8)
SAO2 % BLDCOA: 98 % (ref 95–99)
SARS-COV-2 RNA PNL SPEC NAA+PROBE: NOT DETECTED
SODIUM SERPL-SCNC: 137 MMOL/L (ref 136–145)
SODIUM SERPL-SCNC: 138 MMOL/L (ref 136–145)
SODIUM SERPL-SCNC: 139 MMOL/L (ref 136–145)
SODIUM SERPL-SCNC: 139 MMOL/L (ref 136–145)
SODIUM SERPL-SCNC: 141 MMOL/L (ref 136–145)
TROPONIN T SERPL-MCNC: 0.02 NG/ML (ref 0–0.03)
WBC # BLD AUTO: 6.33 10*3/MM3 (ref 3.4–10.8)
WBC # BLD AUTO: 6.72 10*3/MM3 (ref 3.4–10.8)
WBC # BLD AUTO: 6.88 10*3/MM3 (ref 3.4–10.8)
WBC # BLD AUTO: 6.96 10*3/MM3 (ref 3.4–10.8)
WBC NRBC COR # BLD: 6.19 10*3/MM3 (ref 3.4–10.8)
WBC NRBC COR # BLD: 6.96 10*3/MM3 (ref 3.4–10.8)
WBC NRBC COR # BLD: 8.81 10*3/MM3 (ref 3.4–10.8)
WHOLE BLOOD HOLD SPECIMEN: NORMAL
WHOLE BLOOD HOLD SPECIMEN: NORMAL

## 2021-01-01 PROCEDURE — 83735 ASSAY OF MAGNESIUM: CPT

## 2021-01-01 PROCEDURE — G0463 HOSPITAL OUTPT CLINIC VISIT: HCPCS

## 2021-01-01 PROCEDURE — 84484 ASSAY OF TROPONIN QUANT: CPT

## 2021-01-01 PROCEDURE — 82805 BLOOD GASES W/O2 SATURATION: CPT | Performed by: INTERNAL MEDICINE

## 2021-01-01 PROCEDURE — 93000 ELECTROCARDIOGRAM COMPLETE: CPT | Performed by: INTERNAL MEDICINE

## 2021-01-01 PROCEDURE — 96372 THER/PROPH/DIAG INJ SC/IM: CPT

## 2021-01-01 PROCEDURE — 93005 ELECTROCARDIOGRAM TRACING: CPT

## 2021-01-01 PROCEDURE — 99223 1ST HOSP IP/OBS HIGH 75: CPT | Performed by: INTERNAL MEDICINE

## 2021-01-01 PROCEDURE — 78306 BONE IMAGING WHOLE BODY: CPT

## 2021-01-01 PROCEDURE — 84153 ASSAY OF PSA TOTAL: CPT

## 2021-01-01 PROCEDURE — 25010000002 DENOSUMAB 120 MG/1.7ML SOLUTION: Performed by: INTERNAL MEDICINE

## 2021-01-01 PROCEDURE — 85025 COMPLETE CBC W/AUTO DIFF WBC: CPT

## 2021-01-01 PROCEDURE — 36600 WITHDRAWAL OF ARTERIAL BLOOD: CPT | Performed by: INTERNAL MEDICINE

## 2021-01-01 PROCEDURE — 80053 COMPREHEN METABOLIC PANEL: CPT | Performed by: EMERGENCY MEDICINE

## 2021-01-01 PROCEDURE — 85730 THROMBOPLASTIN TIME PARTIAL: CPT | Performed by: EMERGENCY MEDICINE

## 2021-01-01 PROCEDURE — 25010000002 AZITHROMYCIN PER 500 MG: Performed by: EMERGENCY MEDICINE

## 2021-01-01 PROCEDURE — 36415 COLL VENOUS BLD VENIPUNCTURE: CPT

## 2021-01-01 PROCEDURE — 80053 COMPREHEN METABOLIC PANEL: CPT

## 2021-01-01 PROCEDURE — 87040 BLOOD CULTURE FOR BACTERIA: CPT | Performed by: EMERGENCY MEDICINE

## 2021-01-01 PROCEDURE — 87804 INFLUENZA ASSAY W/OPTIC: CPT | Performed by: EMERGENCY MEDICINE

## 2021-01-01 PROCEDURE — 93005 ELECTROCARDIOGRAM TRACING: CPT | Performed by: EMERGENCY MEDICINE

## 2021-01-01 PROCEDURE — 71260 CT THORAX DX C+: CPT

## 2021-01-01 PROCEDURE — 25010000002 DENOSUMAB 120 MG/1.7ML SOLUTION: Performed by: NURSE PRACTITIONER

## 2021-01-01 PROCEDURE — 83735 ASSAY OF MAGNESIUM: CPT | Performed by: EMERGENCY MEDICINE

## 2021-01-01 PROCEDURE — 96367 TX/PROPH/DG ADDL SEQ IV INF: CPT

## 2021-01-01 PROCEDURE — 25010000002 ENOXAPARIN PER 10 MG: Performed by: INTERNAL MEDICINE

## 2021-01-01 PROCEDURE — 99284 EMERGENCY DEPT VISIT MOD MDM: CPT

## 2021-01-01 PROCEDURE — 96402 CHEMO HORMON ANTINEOPL SQ/IM: CPT

## 2021-01-01 PROCEDURE — 74177 CT ABD & PELVIS W/CONTRAST: CPT

## 2021-01-01 PROCEDURE — 83880 ASSAY OF NATRIURETIC PEPTIDE: CPT

## 2021-01-01 PROCEDURE — 71045 X-RAY EXAM CHEST 1 VIEW: CPT

## 2021-01-01 PROCEDURE — U0004 COV-19 TEST NON-CDC HGH THRU: HCPCS | Performed by: EMERGENCY MEDICINE

## 2021-01-01 PROCEDURE — 99214 OFFICE O/P EST MOD 30 MIN: CPT | Performed by: NURSE PRACTITIONER

## 2021-01-01 PROCEDURE — 84100 ASSAY OF PHOSPHORUS: CPT

## 2021-01-01 PROCEDURE — 0 TECHNETIUM MEDRONATE KIT: Performed by: INTERNAL MEDICINE

## 2021-01-01 PROCEDURE — 25010000002 LEUPROLIDE ACETATE (3 MONTH) PER 7.5 MG: Performed by: INTERNAL MEDICINE

## 2021-01-01 PROCEDURE — 0 IOPAMIDOL PER 1 ML: Performed by: INTERNAL MEDICINE

## 2021-01-01 PROCEDURE — A9503 TC99M MEDRONATE: HCPCS | Performed by: INTERNAL MEDICINE

## 2021-01-01 PROCEDURE — 96365 THER/PROPH/DIAG IV INF INIT: CPT

## 2021-01-01 PROCEDURE — 99214 OFFICE O/P EST MOD 30 MIN: CPT | Performed by: INTERNAL MEDICINE

## 2021-01-01 PROCEDURE — 82375 ASSAY CARBOXYHB QUANT: CPT | Performed by: INTERNAL MEDICINE

## 2021-01-01 PROCEDURE — 83605 ASSAY OF LACTIC ACID: CPT | Performed by: EMERGENCY MEDICINE

## 2021-01-01 PROCEDURE — 99213 OFFICE O/P EST LOW 20 MIN: CPT | Performed by: INTERNAL MEDICINE

## 2021-01-01 PROCEDURE — 25010000002 CEFTRIAXONE PER 250 MG: Performed by: EMERGENCY MEDICINE

## 2021-01-01 PROCEDURE — 83050 HGB METHEMOGLOBIN QUAN: CPT | Performed by: INTERNAL MEDICINE

## 2021-01-01 PROCEDURE — 85610 PROTHROMBIN TIME: CPT | Performed by: EMERGENCY MEDICINE

## 2021-01-01 RX ORDER — ASPIRIN 81 MG/1
81 TABLET, CHEWABLE ORAL DAILY
COMMUNITY

## 2021-01-01 RX ORDER — HYDROCODONE BITARTRATE AND ACETAMINOPHEN 5; 325 MG/1; MG/1
1 TABLET ORAL EVERY 6 HOURS PRN
Status: CANCELLED | OUTPATIENT
Start: 2021-01-01 | End: 2022-01-01

## 2021-01-01 RX ORDER — CARVEDILOL 3.12 MG/1
3.12 TABLET ORAL 2 TIMES DAILY
Qty: 180 TABLET | Refills: 3 | OUTPATIENT
Start: 2021-01-01 | End: 2021-01-01

## 2021-01-01 RX ORDER — HYDROCODONE BITARTRATE AND ACETAMINOPHEN 5; 325 MG/1; MG/1
1 TABLET ORAL EVERY 6 HOURS PRN
Qty: 120 TABLET | Refills: 0 | Status: SHIPPED | OUTPATIENT
Start: 2021-01-01 | End: 2022-01-01

## 2021-01-01 RX ORDER — BICALUTAMIDE 50 MG/1
50 TABLET, FILM COATED ORAL DAILY
Qty: 30 TABLET | Refills: 5 | Status: SHIPPED | OUTPATIENT
Start: 2021-01-01

## 2021-01-01 RX ORDER — SODIUM CHLORIDE 0.9 % (FLUSH) 0.9 %
10 SYRINGE (ML) INJECTION AS NEEDED
Status: DISCONTINUED | OUTPATIENT
Start: 2021-01-01 | End: 2021-01-01 | Stop reason: HOSPADM

## 2021-01-01 RX ORDER — PHENOL 1.4 %
600 AEROSOL, SPRAY (ML) MUCOUS MEMBRANE 2 TIMES DAILY
COMMUNITY

## 2021-01-01 RX ORDER — BICALUTAMIDE 50 MG/1
TABLET, FILM COATED ORAL
COMMUNITY
Start: 2021-01-01 | End: 2021-01-01 | Stop reason: SDUPTHER

## 2021-01-01 RX ORDER — FUROSEMIDE 20 MG/1
20 TABLET ORAL ONCE AS NEEDED
COMMUNITY
End: 2021-01-01

## 2021-01-01 RX ORDER — SPIRONOLACTONE 25 MG/1
25 TABLET ORAL EVERY MORNING
Status: CANCELLED | OUTPATIENT
Start: 2021-01-01

## 2021-01-01 RX ORDER — FUROSEMIDE 20 MG/1
TABLET ORAL
Qty: 90 TABLET | Refills: 3 | OUTPATIENT
Start: 2021-01-01 | End: 2021-01-01

## 2021-01-01 RX ORDER — CEFTRIAXONE SODIUM 1 G/50ML
1 INJECTION, SOLUTION INTRAVENOUS DAILY
Status: DISCONTINUED | OUTPATIENT
Start: 2021-01-01 | End: 2021-01-01 | Stop reason: HOSPADM

## 2021-01-01 RX ORDER — SODIUM CHLORIDE 0.9 % (FLUSH) 0.9 %
10 SYRINGE (ML) INJECTION EVERY 12 HOURS SCHEDULED
Status: DISCONTINUED | OUTPATIENT
Start: 2021-01-01 | End: 2021-01-01 | Stop reason: HOSPADM

## 2021-01-01 RX ORDER — SPIRONOLACTONE 25 MG/1
TABLET ORAL
Qty: 30 TABLET | Refills: 0 | OUTPATIENT
Start: 2021-01-01 | End: 2021-01-01

## 2021-01-01 RX ORDER — ROSUVASTATIN CALCIUM 5 MG/1
10 TABLET, COATED ORAL DAILY
Status: CANCELLED | OUTPATIENT
Start: 2021-01-01

## 2021-01-01 RX ORDER — ASPIRIN 81 MG/1
81 TABLET, CHEWABLE ORAL DAILY
Status: CANCELLED | OUTPATIENT
Start: 2021-01-01

## 2021-01-01 RX ORDER — ACETAMINOPHEN 325 MG/1
650 TABLET ORAL EVERY 4 HOURS PRN
Status: DISCONTINUED | OUTPATIENT
Start: 2021-01-01 | End: 2021-01-01 | Stop reason: HOSPADM

## 2021-01-01 RX ORDER — TC 99M MEDRONATE 20 MG/10ML
22.5 INJECTION, POWDER, LYOPHILIZED, FOR SOLUTION INTRAVENOUS
Status: COMPLETED | OUTPATIENT
Start: 2021-01-01 | End: 2021-01-01

## 2021-01-01 RX ORDER — CEFTRIAXONE SODIUM 1 G/50ML
1 INJECTION, SOLUTION INTRAVENOUS ONCE
Status: COMPLETED | OUTPATIENT
Start: 2021-01-01 | End: 2021-01-01

## 2021-01-01 RX ORDER — FUROSEMIDE 40 MG/1
20 TABLET ORAL DAILY
COMMUNITY
Start: 2021-01-01 | End: 2021-01-01 | Stop reason: DRUGHIGH

## 2021-01-01 RX ORDER — BICALUTAMIDE 50 MG/1
50 TABLET, FILM COATED ORAL DAILY
Status: CANCELLED | OUTPATIENT
Start: 2021-01-01

## 2021-01-01 RX ORDER — SPIRONOLACTONE 25 MG/1
TABLET ORAL
COMMUNITY
Start: 2021-01-01 | End: 2021-01-01

## 2021-01-01 RX ORDER — IPRATROPIUM BROMIDE AND ALBUTEROL SULFATE 2.5; .5 MG/3ML; MG/3ML
3 SOLUTION RESPIRATORY (INHALATION) EVERY 4 HOURS PRN
Status: DISCONTINUED | OUTPATIENT
Start: 2021-01-01 | End: 2021-01-01 | Stop reason: HOSPADM

## 2021-01-01 RX ORDER — CARVEDILOL 3.12 MG/1
TABLET ORAL
COMMUNITY
Start: 2021-01-01 | End: 2021-01-01

## 2021-01-01 RX ORDER — LEVOFLOXACIN 750 MG/1
750 TABLET ORAL
Qty: 4 TABLET | Refills: 0 | Status: SHIPPED | OUTPATIENT
Start: 2021-01-01 | End: 2022-01-01

## 2021-01-01 RX ORDER — CHOLECALCIFEROL (VITAMIN D3) 125 MCG
1000 CAPSULE ORAL DAILY
Status: CANCELLED | OUTPATIENT
Start: 2021-01-01 | End: 2022-05-25

## 2021-01-01 RX ORDER — ALBUTEROL SULFATE 90 UG/1
2 AEROSOL, METERED RESPIRATORY (INHALATION) EVERY 4 HOURS PRN
Qty: 6.7 G | Refills: 0 | Status: SHIPPED | OUTPATIENT
Start: 2021-01-01

## 2021-01-01 RX ORDER — ROSUVASTATIN CALCIUM 20 MG/1
20 TABLET, COATED ORAL DAILY
COMMUNITY
Start: 2021-01-01

## 2021-01-01 RX ORDER — CALCIUM CARBONATE 200(500)MG
1 TABLET,CHEWABLE ORAL DAILY
Status: CANCELLED | OUTPATIENT
Start: 2021-01-01

## 2021-01-01 RX ORDER — CARVEDILOL 3.12 MG/1
3.12 TABLET ORAL 2 TIMES DAILY
Status: CANCELLED | OUTPATIENT
Start: 2021-01-01

## 2021-01-01 RX ADMIN — DENOSUMAB 120 MG: 120 INJECTION SUBCUTANEOUS at 09:43

## 2021-01-01 RX ADMIN — IOPAMIDOL 100 ML: 755 INJECTION, SOLUTION INTRAVENOUS at 08:47

## 2021-01-01 RX ADMIN — LEUPROLIDE ACETATE 22.5 MG: KIT at 12:42

## 2021-01-01 RX ADMIN — IOPAMIDOL 100 ML: 755 INJECTION, SOLUTION INTRAVENOUS at 08:18

## 2021-01-01 RX ADMIN — SODIUM CHLORIDE, PRESERVATIVE FREE 10 ML: 5 INJECTION INTRAVENOUS at 09:20

## 2021-01-01 RX ADMIN — LEUPROLIDE ACETATE 22.5 MG: KIT at 09:46

## 2021-01-01 RX ADMIN — CEFTRIAXONE SODIUM 1 G: 1 INJECTION, SOLUTION INTRAVENOUS at 04:00

## 2021-01-01 RX ADMIN — DENOSUMAB 120 MG: 120 INJECTION SUBCUTANEOUS at 09:59

## 2021-01-01 RX ADMIN — TC 99M MEDRONATE 22.5 MILLICURIE: 20 INJECTION, POWDER, LYOPHILIZED, FOR SOLUTION INTRAVENOUS at 08:50

## 2021-01-01 RX ADMIN — DENOSUMAB 120 MG: 120 INJECTION SUBCUTANEOUS at 11:39

## 2021-01-01 RX ADMIN — DENOSUMAB 120 MG: 120 INJECTION SUBCUTANEOUS at 12:40

## 2021-01-01 RX ADMIN — ENOXAPARIN SODIUM 40 MG: 40 INJECTION SUBCUTANEOUS at 09:20

## 2021-01-01 RX ADMIN — DENOSUMAB 120 MG: 120 INJECTION SUBCUTANEOUS at 10:27

## 2021-01-01 RX ADMIN — DENOSUMAB 120 MG: 120 INJECTION SUBCUTANEOUS at 09:44

## 2021-05-10 NOTE — H&P
History and Physical      Patient Name: Hari Morillo   Patient ID: [id number]   Sex: Male   YOB: 1940    Primary Care Provider: Jacob HUNTER   Referring Provider: Melina Galloway MD    Visit Date: May 8, 2020    Provider: Yoni Brown MD   Location: [facility]   Location Address: [address] [place] [address], [address] [place] [address]  [id number]   Location Phone: [phone]          Chief Complaint  · Possible prostate cancer      History Of Present Illness  The patient is a 79 year old /White male , who has been referred by MD Nilesh, is here for possible prostate cancer.          According to the patient's son his PSA is 101.  Patient has been having pains in his bone specially on his left hip.  Pain has been going on for about couple of years.  Patient has nocturia at least every hour to hour and a half.  Not much frequency in the daytime and his stream is okay.  He is not allergic to anything.  Open heart surgery about 6 years ago.  Patient is also incontinent on himself.  Patient has not been going to the doctors till he went to emergency room because of pain in the hip and then was referred to Dr. galloway       Past Medical History  CHF (congestive heart failure)         Medication List  aspirin 81 mg oral tablet,chewable; carvedilol 3.125 mg oral tablet; furosemide 40 mg oral tablet; rosuvastatin oral; spironolactone 25 mg oral tablet         Review of Systems  · Constitutional  o Denies  o : fever, headache, chills  · Eyes  o Denies  o : eye pain, double vision, blurred vision  · HENT  o Denies  o : sinus problems, sore throat, ear infection  · Cardiovascular  o Denies  o : chest pain, high blood pressure, varicosities  · Respiratory  o Admits  o : HISTORY OF SHORT OF AIR  o Denies  o : , wheezing, frequent cough  · Gastrointestinal  o Denies  o : nausea, vomiting, heartburn, indigestion, abdominal pain  · Genitourinary  o Admits  o : urgency, frequency,  "nocturia  o Denies  o : urinary retention, painful urination  · Integument  o Denies  o : rash, itching, boils  · Neurologic  o Denies  o : tingling or numbness, tremors, dizzy spells  · Musculoskeletal  o Admits  o : HIP PAIN LEFT SIDE  o Denies  o : joint pain, neck pain, back pain  · Endocrine  o Denies  o : cold intolerance, heat intolerance, tired, excessive thirst, sluggish  · Psychiatric  o Admits  o : feels satisfied with life  o Denies  o : severe depression, concerns with hurting themselves  · Heme-Lymph  o Denies  o : swollen glands, blood clotting problems  · Allergic-Immunologic  o Denies  o : sinus allergy symptoms, hay fever  · All Others Negative      Vitals  Date Time BP Position Site L\R Cuff Size HR RR TEMP (F) WT  HT  BMI kg/m2 BSA m2 O2 Sat HC       05/08/2020 02:44 /72 Sitting    78 - R  98.4 180lbs 0oz 6'  2\" 23.11 2.06           Physical Examination  · Constitutional  o Appearance  o : 79-year-old white male is in no acute distress. He is accompanied by his son no anemia jaundice or cyanosis present  · Neck  o Thyroid  o : gland size normal, nontender, no nodules or masses present on palpation  · Respiratory  o Respiratory Effort  o : Breathing is unlabored without accessory muscle use  o Inspection of Chest  o : normal appearance, no retractions  o Auscultation of Lungs  o : normal breath sounds throughout  · Cardiovascular  o Heart  o :   § Auscultation of Heart  § : regular rate, normal rhythm, systolic murmurs present at the apex, no pericardial friction rub  o Peripheral Vascular System  o : No abnormalities  · Gastrointestinal  o Abdominal Examination  o : abdomen nontender to palpation, normal bowel sounds, tone normal without rigidity or guarding, no masses present, abdomen scaphoid upon supine  o Liver and spleen  o : No hepatomegaly present. Liver is non-tender to palpation and spleen is not palpable.  · Genitourinary  o Bladder  o : no abnormalities  o Penis  o : Normal " uncircumcised penis  o Urethral Meatus  o : no abnormalities  o Scrotum and Scrotal Contents  o :   § Scrotum  § : no abnormalities  § Epididymides  § : no abnormalities  § Testes  § : no abnormalities  o Digital Rectal Examination  o :   § Prostate  § : Hard prostate on the patient left and fullness of the prostate on the right. Size will be about 30 g and suspicious for prostate cancer  · Lymphatic  o Groin  o : No lymphadenopathy present  · Skin and Subcutaneous Tissue  o General Inspection  o : No rashes, lesions or areas of discoloration present. Skin turgor is normal.  · Neurologic  o Mental Status Examination  o : grossly oriented to person, place and time  o Gait and Station  o : normal gait, able to stand without difficulty  · Psychiatric  o Mood and Affect  o : mood normal, affect appropriate      Figure 1.0: Pain Rating Scale-[place]         Results  · In-Office Procedures  o Lab procedure  § Automated dipstick urinalysis with microscopy (47849)   § Color Ur: Dark yellow   § Clarity Ur: Cloudy   § Glucose Ur Ql Strip: Negative   § Bilirub Ur Ql Strip: Negative   § Ketones Ur Ql Strip: Negative   § Sp Gr Ur Qn: greater than 1.030   § Hgb Ur Ql Strip: Small   § pH Ur-LsCnc: 6.5   § Prot Ur Ql Strip: Negative   § Urobilinogen Ur Strip-mCnc: 0.2 E.U./dL   § Nitrite Ur Ql Strip: Negative   § WBC Est Ur Ql Strip: Negative   § RBC UrnS Qn HPF: 0   § WBC UrnS Qn HPF: 5-6   § Bacteria UrnS Qn HPF: 4+   § Crystals UrnS Qn HPF: 0   § Epithelial Cells (non renal): 0 /HPF  § Epithelial Cells (renal): 0       Assessment  · BPH with Urinary Obstruction       Benign prostatic hyperplasia with lower urinary tract symptoms     600.01/N40.1  Other obstructive and reflux uropathy     600.01/N13.8  · Elevated Prostate Specific Antigen (PSA)     790.93/R97.2  · Urinary Tract Infection     599.0/N39.0    Problems Reconciled  Plan  · Orders  o Urine Culture [facility] (55828) - 600.01/N40.1, 600.01/N13.8, 790.93/R97.2, 599.0/N39.0 -  05/08/2020  · Medications  o Medications have been Reconciled  o Transition of Care or Provider Policy  · Instructions  o Urine culture is done and they are going to do ultrasound of prostate biopsy next Friday and treat his urinary tract infection before hopefully starting on Monday.            Electronically Signed by: Yoni Brown MD -Author on May 8, 2020 03:52:34 PM

## 2021-05-10 NOTE — H&P
History and Physical      Patient Name: Hari Morillo   Patient ID: [id number]   Sex: Male   YOB: 1940    Primary Care Provider: Jacob HUNTER   Referring Provider: Jacob HUNTER    Visit Date: May 22, 2020    Provider: Jair Weeks MD   Location: [facility]   Location Address: [address] [place] [address]  [id number]   Location Phone: [phone]          Chief Complaint  · pt here for urologic issues      History Of Present Illness     79-year-old gentleman who is a patient Dr. Brown's who is here for elevated PSA of 101    Patient was having some urgency and urge incontinence.  This is improved after antibiotics.  Good stream.  No trouble with initiation or intermittency of stream. Nocturia X 1- 2.  No urgency or frequency.  No incontinence.  No prostate meds    Patient with pain in his right hip intermittently.  This is been bothering him for about 1 month.  Sent him to the ER and prompted below imaging    5/20 bone scanmultiple foci of increased uptake throughout the axial skeleton including the manubrium sternum and bilateral ribs thoracic spine lumbar spine bony pelvis and right femoral neck.  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    no gross hematuria, dysuria or recurrent urinary tract infections.  No history of kidney stone.    Sister had kidney CA,   Has never had any urologic surgery.    2016 CABG.  Patient does not smoke.  ASA 81    Been off baby aspirin 2 weeks.  He was scheduled with a prostate biopsy Dr. Brown    4/20 creatinine 1.0, GFR 76      PSA    4/20  101       Past Medical History  CHF (congestive heart failure)         Past Surgical History  Triple coronary bypass         Medication List  aspirin 81 mg oral tablet,chewable; carvedilol 3.125 mg oral tablet; Casodex 50 mg oral tablet; Cipro 500 mg oral tablet; furosemide 40 mg oral tablet;  "rosuvastatin oral; spironolactone 25 mg oral tablet         Allergy List  NO KNOWN DRUG ALLERGIES         Social History  Alcohol; Tobacco (Current every day)         Review of Systems  · Constitutional  o Denies  o : chills, fever  · Gastrointestinal  o Denies  o : nausea, vomiting      Vitals  Date Time BP Position Site L\R Cuff Size HR RR TEMP (F) WT  HT  BMI kg/m2 BSA m2 O2 Sat        05/22/2020 08:39 AM       17  206lbs 4oz 6'  4\" 25.11 2.24           Physical Examination  · Constitutional  o Appearance  o : Well-appearing, well-developed, in no acute distress  · Respiratory  o Respiratory Effort  o : Unlabored breathing  o Auscultation of Lungs  o : Unlabored breathing, clear to auscultation bilaterally  · Cardiovascular  o Heart  o :   § Auscultation of Heart  § : Regular rate and rhythm, no murmurs  · Gastrointestinal  o Abdominal Examination  o : Nontender, nondistended, no rigidity or guarding, no hepatosplenomegaly  · Neurologic  o Mental Status Examination  o :   § Orientation  § : Grossly oriented to person, place and time, judgment and insight intact, normal mood and affect              Assessment  · Elevated PSA     790.93/R97.20    Problems Reconciled  Plan  · Medications  o Medications have been Reconciled  o Transition of Care or Provider Policy  · Instructions  o Electronically Identified Patient Education Materials Provided Electronically       Reviewed records today and summarized in the chart    Discussed with the patient he likely has metastatic prostate cancer.    We discussed treatment.  Patient would like to start treatment and get his biopsy scheduled    Casodex 50 mg daily x1 month.  Risk/benefits and side effect discussed    Follow-up this coming week for prostate biopsy    Discussed the natural history of prostate cancer and also prostate cancer screening.  We discussed his elevated PSA.  After risk and benefits were discussed the patient would like to proceed with prostate biopsy.  " Risk of bleeding in the urine/semen/stool was discussed and also the 3% risk of sepsis.  We discussed the risk of severe rectal bleeding and also the risk of urinary retention. Patient voiced understanding and would like to proceed.    Come in 1 hour early for ceftriaxone injection.    3 days ciprofloxacin given to be taken anni-procedural    Dr. Javed has done labs on the patient I will acquire these records.    Discussed with patient that if biopsy comes back positive which is likely will we discussed starting him on Lupron 1 month after Casodex.  Patient voiced understanding handout given today.    Greater than 30 minutes was used in counseling and coordination of care, with greater than 51% of this in face-to-face counseling             Electronically Signed by: Jair Weeks MD -Author on May 29, 2020 07:34:39 AM

## 2021-05-10 NOTE — PROCEDURES
Procedure Note      Patient Name: Hari Morillo   Patient ID: [id number]   Sex: Male   YOB: 1940    Primary Care Provider: Jacob HUNTER   Referring Provider: Jacob HUNTER    Visit Date: May 26, 2020    Provider: Jair Weeks MD   Location: [facility]   Location Address: [address] [place] [address]  [id number]   Location Phone: [phone]          --------------------Transrectal Ultrasound of the Prostate and/or Prostate Needle Biopsy------------------    The procedure is done for the indication of an elevated PSA. The PSA is known. The patient did not receive an enema prior to the procedure. The patient has also taken Cipro prior to the procedure. Medications that have been stopped include ASA.   He was placed in the left lateral decubitus position. Rectal exam revealed no suspicious lesions. A transrectal ultrasound probe was then lubricated, covered with a condom, and placed into the rectum. The [employer] ultrasound machine at 7.5 MHz was used to perform the ultrasound exam. The prostate was scanned in both transverse and longitudinal fashion. Multiple images were obtained. Local anesthesia was used to infiltrate the neurovascular bundles bilaterally. This consisted of 10 cc of 1% Lidocaine.   The prostate height is 3.5 mm.   The prostate width is 4.5 mm.   The prostate length is 5.0 mm.   The total prostate volume is 42.5 gms.   The appearance of the prostate is normal.   Biopsies were done. 12 biopsies are done using the Biopty gun with an 18 gauge needle, representing the peripheral and transitional zones.   The patient tolerated the procedure well without apparent complications. There was no bleeding at the end of the procedure.   The patients urine was viewed under a microscope during his clinical visit: no RBC present, no WBC present, no Bacteria present.           Assessment  · Elevated prostate specific antigen (PSA)     790.93/R97.20    Problems  Reconciled  Plan  · Orders  o Ultrasound guided biopsy of extremity (54177) - 790.93/R97.20 - 05/26/2020  o Transrectal ultrasound examination (64078) - 790.93/R97.20 - 05/26/2020  o Needle biopsy of prostate ([id number]) - 790.93/R97.20 - 05/26/2020  · Medications  o Medications have been Reconciled  o Transition of Care or Provider Policy  · Instructions  o Post-op instructions: Avoid strenuous activity and intercourse for 48 hours, resume aspirin-like products in 24 hours as needed and call for any problems.  o In 3 weeks  o Electronically Identified Patient Education Materials Provided Electronically            Electronically Signed by: Jair Weeks MD -Author on May 26, 2020 06:33:17 PM

## 2021-05-14 NOTE — PROGRESS NOTES
Progress Note      Patient Name: Hari Morillo   Patient ID: [id number]   Sex: Male   YOB: 1940    Primary Care Provider: Jacob HUNTER   Referring Provider: Jacob HUNTER    Visit Date: December 11, 2020    Provider: ELSA Cardona   Location: [facility]   Location Address: [address] [place] [address], [facility]   [place], [place]  [id number]   Location Phone: [phone]          Chief Complaint     Shortness of breath.       History Of Present Illness  REFERRING PROVIDER: Jacob HUNTER   Hari Morillo is a 80 year old /White male who continues to be short of breath. It is mild with moderate exertion and that is long-term and normal for him. Since he was last here, he was diagnosed with prostate cancer that has metastasized to his lungs and his bones. He denies any chest pain or pressure. No palpitations, swelling, dizziness, syncope, PND, or orthopnea. He has gained a couple of pounds since his last visit. He admits his diet is high in [facility].   PAST MEDICAL HISTORY: Chronic kidney disease; Coronary artery disease with previous bypass surgery x3 in October 2015 (LIMA to LAD, SVG to 2nd diagonal, and SVG to 1st marginal); Cardiomyopathy with chronic combined heart failure (EF of 25-30% in January 2018); Hyperlipidemia; Hypertension.   PSYCHOSOCIAL HISTORY: Denies alcohol use.   CURRENT MEDICATIONS: Aspirin 81 mg daily; calcium 600 mg b.i.d.; carvedilol 3.125 mg b.i.d.; furosemide 40 mg daily; rosuvastatin 10 mg daily; spironolactone 25 mg daily; bicalutamide 50 mg daily; Lupron injection q. 4 weeks.      ALLERGIES:  Losartan, Entresto causes itching and rash.       Review of Systems  · Cardiovascular  o Admits  o : shortness of breath while walking or lying flat  o Denies  o : palpitations (fast, fluttering, or skipping beats), swelling (feet, ankles, hands), chest pain or angina pectoris   · Respiratory  o Denies  o : chronic or frequent cough      Vitals  Date  "Time BP Position Site L\R Cuff Size HR RR TEMP (F) WT  HT  BMI kg/m2 BSA m2 O2 Sat FR L/min FiO2 HC       12/11/2020 10:07 /80 Sitting    76 - R   205lbs 16oz 6'  2\" 26.45 2.21             Physical Examination  · Constitutional  o Appearance  o : Awake, alert, in no acute distress, extremely hard-of-hearing, accompanied by his son.  · Eyes  o Conjunctivae  o : Conjunctivae normal.  · Ears, Nose, Mouth and Throat  o Oral Cavity  o :   § Oral Mucosa  § : Normal.  · Neck  o Jugular Veins  o : No JVD. Good carotid upstroke. No bruits noted.  · Respiratory  o Respiratory  o : Good respiratory effort. Clear to percussion and auscultation.  · Cardiovascular  o Heart  o : PMI is displaced. Heart sounds are distant. S1, S2 normal. No S3. S4 plus. Negative systolic/diastolic murmur.   o Peripheral Vascular System  o :   § Extremities  § : Trace pedal edema. Good femoral and pedal pulses.   · Gastrointestinal  o Abdominal Examination  o : Soft. No tenderness or masses felt. No hepatosplenomegaly. Abdominal aorta is not palpable.  · Labs  o Labs  o : Glucose 116, BUN 32, creatinine 1.49, total cholesterol 184, triglycerides 112, LDL 92, HDL 70.          Assessment     1.  Hyperlipidemia, needs tighter control.  2.  Coronary artery disease with previous bypass without angina.  3.  Ischemic cardiomyopathy with reduced ejection fraction of 25-30%.  4.  Chronic combined heart failure, class II-III, stable.  5.  Hypertension, controlled.    6.  Chronic kidney disease, stage 3, slightly worse.       Plan     1.  Increase rosuvastatin to 20 mg for tighter control of his lipids.  2.  We discussed ICD in the past, and he continues to refuse.  3.  Check a Lipid and CMP in 3 months.  4.  Follow up in 9 months or earlier if needed.  May consider a repeat echocardiogram at that time.        ELSA Mae  JF:delilah             Electronically Signed by: Janneth Love-, Other -Author on December 19, 2020 " 08:11:05 AM

## 2021-05-28 NOTE — PROGRESS NOTES
Patient: TELLY SALINAS     Acct: [id number]     Report: #[id number]  UNIT #: [id number]     : 1940    Encounter Date:2020  PRIMARY CARE:   ***Signed***  --------------------------------------------------------------------------------------------------------------------  DATE: 20      Primary Care Provider      Primary Care Provider:  SIMI JOLLEY            Referring Physician      Referring Provider not in look:        [facility]            Chief Complaint      FU probable prostate Cancer            Subjective      79-year-old white male was initially referred because of an incidental finding o    f blastic lesion on his right ischium by a pelvic x-ray performed while the     patient had sought consultation for severe pain on his right hip and thigh.      Further work-up on this patient showed a PSA of 101, negative multiple myeloma     work-up and E. coli UTI that has been treated with Augmentin.            Patient has a bone scan that showed multiple foci of increased uptake throughout    the axial skeleton including the manubrium, sternum, bilateral ribs, thoracic     spine, lumbar spine bony pelvis and right femoral neck.  Patient was referred to    urology but this was canceled by the urologist office and the patient now has     another appointment tomorrow with a different urologist.            Patient claims that he is not having any more bone pains.  Ibuprofen 20 mg 3     times a day was enough to relieve his pain.            Discussed with him and his son different treatment options for his prostate     cancer.  I have also discussed Xgeva as part of his treatment plan.            Past Med/Surg History            Past Med/Surg History:   Hypertension             No Diabetes Mellitus             Heart Disease (Status post bypass 5 years ago)             No Blood Clots             No Cancer             Other (Hyperlipidemia)            Social History      Social History:  No Tobacco  Use, No Alcohol Use, No Recreational Drug use            Allergies      Coded Allergies:             NO KNOWN ALLERGIES (Unverified , 8/9/15)            Medications      Medications    Last Reconciled on 4/30/20 18:20 by EDIE GALLOWAY MD      Rosuvastatin Calcium (Crestor*) 40 Mg Tablet      40 MG PO HS, #30 TAB 0 Refills         Reported         5/21/20       Amoxicillin/Clavulanic Acid 875/125 (Augmentin 875/125) 1 Each Tablet      875 MG PO BID for 10 Days, #20 TAB 0 Refills         Prov: Melina Galloway         5/12/20       Spironolactone (Aldactone) 25 Mg Tablet      25 MG PO QDAY, #30 TAB 0 Refills         Reported         4/30/20       Furosemide (Furosemide) 40 Mg Tablet      40 MG PO QDAY, #30 TAB 0 Refills         Reported         4/30/20       Carvedilol (Coreg) 3.125 Mg Tab      3.125 MG PO BID, #60 TAB 5 Refills         Prov: EARLINE ANDRADE         8/11/15      Current Medications      Current Medications Reviewed 5/21/20            Pain Assessment      Pain Intensity:  0      Description:  None            Review of Systems      General:  No Anxiety; Fatigue Scale: (0), Pain Scale: (0)      HEENT:  No Dysphagia      Respiratory:  No Cough; Shortness of Air      Cardiovascular:  No Chest Pain, No Pedal Edema      Gastrointestinal:  No Nausea; Appetite Good (Good)      Genitourinary:  No Nocturia      Musculoskeletal:  No Joint Effusions, No Joint Tenderness      Endocrine:  No Heat Intolerance      Hematologic:  No Bleeding      Allergic/Immunologic:  No Hives      Psychological:  No Anxiety      Neurological:  No Headaches      Skin:  No Rash, No Open Wounds            Vitals      Height 6 ft 2 in / 187.96 cm      Weight 205 lbs 3.2 oz / 93.45528 kg      BSA 2.20 m2      BMI 26.3 kg/m2      Temperature 98.9 F / 37.17 C      Pulse 84      Respirations 18      Blood Pressure 112/69      Pulse Oximetry 99%            Exam      Constitutional:  No acute distress, Conversant, Pleasant      Eyes:  Anicteric sclerae,  Palpebral Conjunctivae, RUDY      Neck:  Supple, Full Range of Motion      Cardiovascular:  RRR; No Murmurs; Normal PMI; No Peripheral Edema      Lungs:  Clear to Ausculation, Normal Respiratory Effort      Abdomen:  Soft; No Tenderness      Chest:  Other (Symmetrical)      Extremities:  No digital cyanosis, No digital ischemia, Normal gait, Other (No     deformity)      Neurological:  Cranial Nerve II-XII (Intact); No Focal Sensory deficits      Psychological:  Appropriate affect, Appropriate mood, Intact judgement, Alert      Skin:  Other (No dermatosis)            Lab Results      Dictated in his history      Radiology Impressions      Dictated in his history            Impression/Problem List      Blastic lesions in the bony pelvis and proximal right femur probably secondary     to prostate carcinoma      History of coronary artery disease      Hypertension      Hyperlipidemia      Notes      Changed Medications      * Rosuvastatin Calcium (Crestor*) 40 MG TABLET: 40 MG PO HS #30         Replaced Unknown Strength TABLET: PO HS #60      Discontinued Medications      * Aspirin Chew 81 MG TAB: 81 MG PO QDAY #60            Plan      Urology consultation with Dr. Weeks in the morning.  Probable prostate     biopsy.      Today explained to the patient the role of Xgeva in his treatment      Discussed use of Lupron and Casodex, orchiectomy for treatment of prostate     cancer.  Prefer not to give chemotherapy to this patient.  Dr. Weeks will p    robably discuss this further with them.            Carbon Copy:      Copies To 2:   Jair Weeks            Patient Education:        DI for Prostate Cancer            PREVENTION      Hx Influenza Vaccination:  No      Influenza Vaccine Declined:  Yes      2 or More Falls Past Year?:  No      Fall Past Year with Injury?:  No      Chart initiated by      ANTONIO Azevedo MA            Electronically signed by Melina Galloway  05/21/2020 18:32       Disclaimer: Converted document  may not contain table formatting or lab diagrams. Please see [employer] for the authenticated document.

## 2021-05-28 NOTE — PROGRESS NOTES
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  Patient been started on Xgeva as well as     Casodex.            Patient claims that he is as good as new.  Patient is to start his Lupron today.            Past Med/Surg History            Past Med/Surg History:   Hypertension             No Diabetes Mellitus             Heart Disease (Status post bypass 5 years ago)             No Blood Clots             No Cancer             Other (Hyperlipidemia)            Social History      Social History:  No Tobacco Use, No Alcohol Use, No Recreational Drug use            Allergies      Coded Allergies:             NO KNOWN ALLERGIES (Unverified , 8/9/15)            Medications      Medications    Last Reconciled on 20 14:48 by EDIE GALLOWAY MD      Bicalutamide (BICALUTAMIDE) 50 Mg Tab      50 MG PO QDAY for 30 Days, #30 TAB 1 Refill         Prov: Melina Galloway         20       Rosuvastatin Calcium (Crestor*) 40 Mg Tablet      40 MG PO HS, #30 TAB 0 Refills         Reported         20       Spironolactone (Aldactone) 25 Mg Tablet      25 MG PO QDAY, #30 TAB 0 Refills         Reported         20       Furosemide (Furosemide) 40 Mg Tablet      40 MG PO QDAY, #30 TAB 0 Refills         Reported         20       Carvedilol (Coreg) 3.125 Mg  Tab      3.125 MG PO BID, #60 TAB 5 Refills         Prov: EARLINE ANDRADE         8/11/15      Current Medications      Current Medications Reviewed 6/18/20            Pain Assessment      Pain Intensity:  0      Description:  None            Review of Systems      General:  No Anxiety; Fatigue Scale: (0), Pain Scale: (0)      HEENT:  No Dysphagia      Respiratory:  No Cough; Shortness of Air      Cardiovascular:  No Chest Pain, No Pedal Edema      Gastrointestinal:  No Nausea; Appetite Good (Good)      Genitourinary:  No Nocturia      Musculoskeletal:  No Joint Effusions, No Joint Tenderness      Endocrine:  No Heat Intolerance      Hematologic:  No Bleeding, No Bruising      Allergic/Immunologic:  No Hives      Psychological:  No Anxiety      Neurological:  No Headaches, No Dizziness      Skin:  No Rash            Vitals      Height 6 ft 2 in / 187.96 cm      Weight 201 lbs 12.8 oz / 91.802072 kg      BSA 2.18 m2      BMI 25.9 kg/m2      Temperature 96.3 F / 35.72 C      Pulse 82      Respirations 18      Blood Pressure 112/70      Pulse Oximetry 98%            Exam      Constitutional:  No acute distress, Conversant, Pleasant      Eyes:  Anicteric sclerae, Palpebral Conjunctivae, RUDY (Pink)      Neck:  Supple      Cardiovascular:  RRR; No Murmurs; Normal PMI; No Peripheral Edema      Lungs:  Clear to Ausculation, Normal Respiratory Effort      Abdomen:  Soft; No Masses, No Tenderness      Chest:  Other (Symmetrical)      Lymphatic:  No Neck      Extremities:  No digital cyanosis, No digital ischemia, Normal gait, Other (No     deformity)      Neurological:  Cranial Nerve II-XII (Intact); No Focal Sensory deficits      Psychological:  Appropriate affect, Appropriate mood, Intact judgement, Alert      Skin:  Other (No dermatosis)            Lab Results      BMP showed calcium of 8, glucose 118, chloride 108      Magnesium 2.6            Impression/Problem List      Blastic lesions in the bony pelvis and proximal right  femur  secondary to     prostate carcinoma      History of coronary artery disease      Hypertension      Hyperlipidemia            Plan      Lupron 7.5 mg monthly.  Xgeva 120 mg subq monthly.      Calcium and vitamin D 1200 mg daily      Routine vitamin D level on next visit.            Patient Education:        Prostate Cancer            PREVENTION      Hx Influenza Vaccination:  No      Influenza Vaccine Declined:  Yes      2 or More Falls Past Year?:  No      Fall Past Year with Injury?:  No      Chart initiated by      ANTONIO Azevedo MA            Electronically signed by Melina Galloway  06/19/2020 14:49       Disclaimer: Converted document may not contain table formatting or lab diagrams. Please see [employer] System for the authenticated document.

## 2021-05-28 NOTE — PROGRESS NOTES
Patient: TELLY SALINAS     Acct: [id number]     Report: #[id number]  UNIT #: [id number]     : 1940    Encounter Date:2020  PRIMARY CARE:   ***Signed***  --------------------------------------------------------------------------------------------------------------------  DATE: 20      Primary Care Provider:  SIMI JOLLEY      Referring Provider not in look:        [facility]      Reason For Consult      NP Consult- Blastic Metastases of Pelvis            History of Present Illness      79-year-old white male has history of on and off pain on his right hip and thigh    for about a year.  According to his son the pain became persistent and worse     this past month and the patient was unable to walk.  So the patient can be seen     sooner patient was brought to the emergency room at which time x-rays were done.            According to the son the patient has polyuria but he is on diuretics.      Patient complains of shortness of air.            Past Medical/Surgical History             Hypertension             No Diabetes Mellitus             Heart Disease (Status post bypass 5 years ago)             No Blood Clots             No Cancer             Other (Hyperlipidemia)            Pyschiatric History      No Depression, No Anxiety, No Panic Attacks, No Bipolar            Social History      Social History:  No Tobacco Use, No Alcohol Use, No Recreational Drug use            Family History      Hypertension (Mother, Father), Heart Disease (Mother, Father)            Allergies      Coded Allergies:             NO KNOWN ALLERGIES (Unverified , 8/9/15)            Medications      Medications    Last Reconciled on 20 18:20 by EDIE ROBERTS MD      Spironolactone (Aldactone) 25 Mg Tablet      25 MG PO QDAY, #30 TAB 0 Refills         Reported         20       Rosuvastatin Calcium (Crestor*) Unknown Strength Tablet      PO HS, #60 TAB 0 Refills         Reported         20       Furosemide  (Furosemide) 40 Mg Tablet      40 MG PO QDAY, #30 TAB 0 Refills         Reported         4/30/20       Carvedilol (Coreg) 3.125 Mg Tab      3.125 MG PO BID, #60 TAB 5 Refills         Prov: EARLINE ANDRADE         8/11/15       Aspirin Chew (Aspirin Chew) 81 Mg Tab      81 MG PO QDAY, #60 TAB.CHEW 5 Refills         Prov: EARLINE ANDRADE         8/11/15      Current Medications      Current Medications Reviewed 4/30/20            Pain Assessment      Pain Intensity:  10      Description:  Aching, Sore      Duration:  Intermittent            Review of Systems      Abnormal as noted below; all other systems have been reviewed and are negative.      General:  No Anxiety; Fatigue Scale: (5), Pain Scale: (10)      HEENT:  No Dysphagia, No Hearing Changes      Respiratory:  No Cough; Shortness of Air      Cardiovascular:  No Chest Pain, No Pedal Edema      Gastrointestinal:  No Nausea, No Vomiting; Appetite Good (Good)      Genitourinary:  No Nocturia      Musculoskeletal:  No Joint Effusions; Aches, Pains (Knees, Hips)      Endocrine:  No Heat Intolerance, No Cold Intolerance      Hematologic:  No Bleeding      Allergic/Immunologic:  No Hives      Psychological:  No Anxiety      Neurological:  No Headaches, No Dizziness      Skin:  No Rash      Vitals:             Height 6 ft 2 in / 187.96 cm           Weight 203 lbs 11.3 oz / 92.288608 kg           BSA 2.19 m2           BMI 26.2 kg/m2           Temperature 97.4 F / 36.33 C           Pulse 72           Respirations 20           Blood Pressure 105/65           Pulse Oximetry 97%            Exam      Constitutional:  No acute distress, Conversant, Pleasant      Eyes:  Anicteric sclerae, Palpebral Conjunctivae (Pink), RUDY      HENT:  Other (Hard of hearing)      Neck:  Supple, Full Range of Motion      Lungs:  Clear to Ausculation, Normal Respiratory Effort      Cardiovascular:  RRR; No Murmurs; Normal PMI; No Peripheral Edema      Abdomen:  Soft, NABS; No Masses, No Tenderness       Skin:  Normal temperature, Normal tone, Other (No dermatosis)      Extremities:  No digital cyanosis, No digital ischemia, Normal gait (On     crutches)      Psychiatric:  Appropriate affect, Intact judgement, AAO x 3      Neurological:  Cranial Nerve II-XII (Intact); No Focal Sensory deficits      Lymphatic:  No Neck            Radiology      CAT scan of the pelvis showed blastic metastases of the bony pelvis and proximal    right femur but no definite pathologic fracture.  And findings patient has     multiple sclerotic lesions of the bony pelvis particularly of the right atrium     and the medial left ilium adjacent to the sacroiliac joint consistent with     metastatic disease.  There are some radiolucencies within the larger lesions but    no definite pathologic fracture.            Impression/Problem List      Blastic lesions in the bony pelvis and proximal right femur probably secondary     to prostate carcinoma      History of coronary artery disease      Hypertension      Hyperlipidemia            Plan      The following tests were performed chest x-ray for the shortness of air      Bone scan      CBC, CMP, PSA      Serum protein electrophoresis with immunofixation, random urine for     immunofixation, serum free light chains, beta-2 microglobulin      Thank you very much for allowing me to participate in the care of your patient.     I will keep you posted on the progress of his workup.            Patient Education:        DI for Fatigue            PREVENTION      Hx Influenza Vaccination:  No      Influenza Vaccine Declined:  Yes      2 or More Falls Past Year?:  No      Fall Past Year with Injury?:  No      Chart initiated by      ANTONIO Azevedo MA            Electronically signed by Melina Galloway  04/30/2020 18:20       Disclaimer: Converted document may not contain table formatting or lab diagrams. Please see [employer] for the authenticated document.

## 2021-05-28 NOTE — PROGRESS NOTES
Patient: TELLY SALINAS     Acct: [id number]     Report: #[id number]  UNIT #: [id number]     : 1940    Encounter Date:2020  PRIMARY CARE:   ***Signed***  --------------------------------------------------------------------------------------------------------------------  DATE: 20      Primary Care Provider      Primary Care Provider:  SIMI JOLLEY            Referring Physician      Referring Provider not in look:        [facility]            Chief Complaint      FU Prostate Cancer            Subjective      79-year-old white male was initially referred to me because of blastic     metastases in his pelvis.  Patient was brought by the son to the emergency room     because of severe pain inability to walk.  Work-ups revealed presence of     elevated PSA.  Patient was sent to urology for biopsy which was done on May 26.     Pathology report showed adenocarcinoma grade group 5 (Dinorah grade 4+5 equal     score of 9) in 3 of 3 core fragments involving 100% of needle core tissue     measuring up to 90 mm in length.  This was found in all the needle biopsies            Patient has been started on Casodex on May 21 as well as Xgeva.  Patient only     took ibuprofen 200 mg 3 times a day for pain.            Patient is smiling, with minimal pain, ambulating without help.            Past Med/Surg History            Past Med/Surg History:   Hypertension             No Diabetes Mellitus             Heart Disease (Status post bypass 5 years ago)             No Blood Clots             No Cancer             Other (Hyperlipidemia)            Social History      Social History:  No Tobacco Use, No Alcohol Use, No Recreational Drug use            Allergies      Coded Allergies:             NO KNOWN ALLERGIES (Unverified , 8/9/15)            Medications      Medications    Last Reconciled on 20 17:21 by EDIE ROBERTS MD      Bicalutamide (BICALUTAMIDE) 50 Mg Tab      50 MG PO QDAY for 30 Days, #30 TAB 1  Refill         Prov: Goldy Gallowayazon         6/11/20       Bicalutamide (BICALUTAMIDE) 50 Mg Tab      50 MG PO QDAY, #30 TAB         Reported         6/11/20       Rosuvastatin Calcium (Crestor*) 40 Mg Tablet      40 MG PO HS, #30 TAB 0 Refills         Reported         5/21/20       Spironolactone (Aldactone) 25 Mg Tablet      25 MG PO QDAY, #30 TAB 0 Refills         Reported         4/30/20       Furosemide (Furosemide) 40 Mg Tablet      40 MG PO QDAY, #30 TAB 0 Refills         Reported         4/30/20       Carvedilol (Coreg) 3.125 Mg Tab      3.125 MG PO BID, #60 TAB 5 Refills         Prov: EARLINE ANDRADE         8/11/15      Current Medications      Current Medications Reviewed 6/11/20            Pain Assessment      Pain Intensity:  0      Description:  None            Review of Systems      General:  No Anxiety; Fatigue Scale: (0), Pain Scale: (0)      HEENT:  No Dysphagia, No Hearing Changes      Respiratory:  No Cough; Shortness of Air      Cardiovascular:  No Chest Pain, No Pedal Edema      Gastrointestinal:  No Nausea; Appetite Good (Good)      Genitourinary:  No Nocturia      Musculoskeletal:  No Joint Effusions, No Joint Tenderness      Endocrine:  No Heat Intolerance      Hematologic:  No Bleeding      Allergic/Immunologic:  No Hives, No Throat closing off      Psychological:  No Anxiety      Neurological:  No Headaches, No Dizziness      Skin:  No Rash, No Open Wounds            Vitals      Height 6 ft 2 in / 187.96 cm      Weight 205 lbs 11.3 oz / 93.104390 kg      BSA 2.20 m2      BMI 26.4 kg/m2      Temperature 98.2 F / 36.78 C      Pulse 70      Respirations 20      Blood Pressure 96/62      Pulse Oximetry 98%            Exam      Constitutional:  No acute distress, Conversant, Pleasant      Eyes:  Anicteric sclerae, Palpebral Conjunctivae, RUDY (Pink)      Neck:  Supple      Cardiovascular:  RRR; No Murmurs; Normal PMI; No Peripheral Edema      Lungs:  Clear to Ausculation, Normal Respiratory Effort       Abdomen:  Soft; No Masses, No Tenderness      Chest:  Other (Symmetrical)      Lymphatic:  No Neck      Extremities:  No digital cyanosis, No digital ischemia, Normal gait, Other (No     deformity)      Neurological:  Cranial Nerve II-XII (Intact); No Focal Sensory deficits      Psychological:  Appropriate affect, Appropriate mood, Intact judgement, Alert      Skin:  Other (No dermatoses)            Impression/Problem List      Blastic lesions in the bony pelvis and proximal right femur  secondary to     prostate carcinoma      History of coronary artery disease      Hypertension      Hyperlipidemia      Notes      New Medications      * BICALUTAMIDE 50 MG TAB: 50 MG PO QDAY #30      * BICALUTAMIDE 50 MG TAB: 50 MG PO QDAY 30 Days #30      Discontinued Medications      * Amoxicillin/Clavulanic Acid 875/125 (Augmentin 875/125) 1 EACH TABLET: 875 MG       PO BID 10 Days #20            Plan      Urology consultation with Dr. Weeks.  Discussed with patient and son the     consultation report from Dr. Weeks which showed that the patient will be     started on Lupron a month after Casodex.  Patient has been started on Casodex     last May 21 and had wanted to receive his Lupron injections in [place]     because of distance.            Next week the patient will get his Xgeva and his first Lupron shot.            Carbon Copy:      Copies To 2:   Jair Weeks            Patient Education:        Prostate Cancer            PREVENTION      Hx Influenza Vaccination:  No      Influenza Vaccine Declined:  Yes      2 or More Falls Past Year?:  No      Fall Past Year with Injury?:  No      Chart initiated by      ANTONIO Azevedo MA            Electronically signed by Melina Galloway  06/12/2020 17:21       Disclaimer: Converted document may not contain table formatting or lab diagrams. Please see [employer] for the authenticated document.

## 2021-05-28 NOTE — PROGRESS NOTES
Patient: TELLY SALINAS     Acct: [id number]     Report: #[id number]  UNIT #: [id number]     : 1940    Encounter Date:2020  PRIMARY CARE:   ***Signed***  --------------------------------------------------------------------------------------------------------------------  DATE: 20      Primary Care Provider      Primary Care Provider:  SIMI JOLLEY            Referring Physician      Referring Provider not in look:        [facility]            Chief Complaint      FU Prostate Cancer            Subjective      79-year-old white male was recently diagnosed with prostate carcinoma last .  Patient presented with intractable pain and blastic lesions in his pelvic     bones.  Patient has since underwent radiation therapy to his painful bony     lesions and it started on Casodex, Lupron, and Xgeva.            Patient is doing well and pain-free.            Past Med/Surg History            Past Med/Surg History:   Hypertension             No Diabetes Mellitus             Heart Disease (Status post bypass 5 years ago)             No Blood Clots             No Cancer             Other (Hyperlipidemia)            Social History      Social History:  No Tobacco Use, No Alcohol Use, No Recreational Drug use            Allergies      Coded Allergies:             NO KNOWN ALLERGIES (Unverified , 8/9/15)            Medications      Medications    Last Reconciled on 20 17:11 by EDIE GALLOWAY MD      Cholecalciferol (Vitamin D3) 400 Unit Tablet      600 UNITS PO QDAY for 30 Days, #45 TAB         Reported         20       Bicalutamide (BICALUTAMIDE) 50 Mg Tab      50 MG PO QDAY for 30 Days, #30 TAB 1 Refill         Prov: Melina Galloway         20       Rosuvastatin Calcium (Crestor*) 40 Mg Tablet      40 MG PO HS, #30 TAB 0 Refills         Reported         20       Spironolactone (Aldactone) 25 Mg Tablet      25 MG PO QDAY, #30 TAB 0 Refills         Reported         20        Furosemide (Furosemide) 40 Mg Tablet      40 MG PO QDAY, #30 TAB 0 Refills         Reported         4/30/20       Carvedilol (Coreg) 3.125 Mg Tab      3.125 MG PO BID, #60 TAB 5 Refills         Prov: EARLINE ANDRADE         8/11/15      Current Medications      Current Medications Reviewed 7/16/20            Pain Assessment      Pain Intensity:  0      Description:  None            Review of Systems      General:  No Anxiety; Fatigue Scale: (0), Pain Scale: (0)      HEENT:  No Dysphagia, No Hearing Changes      Respiratory:  No Cough; Shortness of Air      Cardiovascular:  No Chest Pain      Gastrointestinal:  No Nausea, No Vomiting; Appetite Good (Good)      Genitourinary:  No Nocturia      Musculoskeletal:  No Joint Effusions      Endocrine:  No Heat Intolerance, No Cold Intolerance      Hematologic:  No Bleeding      Allergic/Immunologic:  No Hives      Psychological:  No Anxiety, No Depression      Neurological:  No Headaches      Skin:  No Rash, No Open Wounds            Vitals      Height 6 ft 2 in / 187.96 cm      Weight 203 lbs 3.2 oz / 92.731053 kg      BSA 2.19 m2      BMI 26.1 kg/m2      Temperature 96.9 F / 36.06 C      Pulse 80      Blood Pressure 124/62      Pulse Oximetry 98%            Exam      Constitutional:  No acute distress, Conversant, Pleasant      Eyes:  Anicteric sclerae, Palpebral Conjunctivae, RUDY      Neck:  Supple, Full Range of Motion      Cardiovascular:  RRR; No Murmurs; Normal PMI; No Peripheral Edema      Lungs:  Clear to Ausculation, Normal Respiratory Effort      Abdomen:  Soft; No Masses, No Tenderness      Chest:  Other (Symmetrical and equal)      Lymphatic:  No Neck      Extremities:  No digital cyanosis, No digital ischemia, Normal gait, Other (No     deform)      Neurological:  Cranial Nerve II-XII (Intact); No Focal Sensory deficits      Psychological:  Appropriate affect, Appropriate mood, Intact judgement, Alert      Skin:  Other (No dermatosis)            Lab Results       PSA 4.11 initial was 101.      Calcium 8.8 magnesium 2.5 phosphorus 2.7 GFR 72.61            Impression/Problem List      Blastic lesions in the bony pelvis and proximal right femur  secondary to     prostate carcinoma.  Patient has biochemical as well as subjective response      History of coronary artery disease      Hypertension      Hyperlipidemia      Notes      New Medications      * Cholecalciferol (Vitamin D3) 400 UNIT TABLET: 600 UNITS PO QDAY 30 Days #45            Plan      Lupron 7.5 mg monthly.  Patient will be switch over to Lupron 22.5 mg every 3     months.      Xgeva 120 mg subq monthly.      Calcium and vitamin D 1200 mg daily            Carbon Copy:      Copies To 2:   Jair Weeks            Patient Education:        Prostate Cancer            PREVENTION      Hx Influenza Vaccination:  No      Influenza Vaccine Declined:  Yes      2 or More Falls in Past Year?:  No      Fall Past Year with Injury?:  No      Chart initiated by      ANTONIO Azevedo MA            Electronically signed by Melina Galloway  07/17/2020 17:11       Disclaimer: Converted document may not contain table formatting or lab diagrams. Please see [employer] System for the authenticated document.

## 2021-05-28 NOTE — PROGRESS NOTES
Patient: TELLY SALINAS     Acct: [id number]     Report: #[id number]  UNIT #: [id number]     : 1940    Encounter Date:2020  PRIMARY CARE:   ***Signed***  --------------------------------------------------------------------------------------------------------------------  ADDENDUM: [id number]          Addendum: Melina Galloway on 20 @ 18:34            On 817 the son called to say that the father had severe pain like he had     initially that was not responding to ibuprofen.  Patient was restarted on     Casodex 50 mg daily and Toradol 10 mg every 6-8 hours as needed for pain      DATE: 20      Primary Care Provider      Primary Care Provider:  SIMI JOLLEY            Referring Physician      Referring Provider not in look:        [facility]            Chief Complaint      FU Prostate Cancer            Subjective      79-year-old white male was initially referred to me for intractable pain which     on work-up came back as prostate carcinoma with widespread metastases to the     bones including manubrium, sternum, bilateral ribs, thoracic spine, lumbar     spine, bony pelvis particularly the right ischium and right femoral neck..      Patient underwent radiation therapy to the painful areas.  He was also started     on Casodex, Lupron and Xgeva.            Past Med/Surg History            Past Med/Surg History:   Hypertension             No Diabetes Mellitus             Heart Disease (Status post bypass 5 years ago)             No Blood Clots             No Cancer             Other (Hyperlipidemia)            Social History      Social History:  No Tobacco Use, No Alcohol Use, No Recreational Drug use            Allergies      Coded Allergies:             NO KNOWN ALLERGIES (Unverified , 8/9/15)            Medications      Medications    Last Reconciled on 20 18:34 by EDIE GALLOWAY MD      Ketorolac Tromethamine (Toradol) 10 Mg Tablet      10 MG PO Q8HR, #30 TAB         Prov:  Jose Gallowayn         8/17/20       Bicalutamide (BICALUTAMIDE) 50 Mg Tab      50 MG PO QDAY for 90 Days, #90 TAB 3 Refills         Prov: Goldy Gallowayazon         8/17/20       Aspirin Chew (Aspirin Baby) 81 Mg Tab.chew      81 MG PO QDAY, #30 TAB.CHEW 0 Refills         Reported         8/13/20       Calcium Carb/Vit D3 (CALCIUM 600-VIT D3 400 TABLET) 1 Each Tablet      1 EACH PO BID, #120 TAB 0 Refills         Reported         8/13/20       Rosuvastatin Calcium (Crestor*) 40 Mg Tablet      40 MG PO HS, #30 TAB 0 Refills         Reported         5/21/20       Spironolactone (Aldactone) 25 Mg Tablet      25 MG PO QDAY, #30 TAB 0 Refills         Reported         4/30/20       Furosemide (Furosemide) 40 Mg Tablet      40 MG PO QDAY, #30 TAB 0 Refills         Reported         4/30/20       Carvedilol (Coreg) 3.125 Mg Tab      3.125 MG PO BID, #60 TAB 5 Refills         Prov: EARLINE ANDRADE         8/11/15      Current Medications      Current Medications Reviewed 8/13/20            Pain Assessment      Pain Intensity:  0      Description:  None            Review of Systems      General:  No Anxiety; Fatigue Scale: (0), Pain Scale: (0)      HEENT:  No Dysphagia      Respiratory:  No Cough; Shortness of Air      Cardiovascular:  No Chest Pain      Gastrointestinal:  No Nausea, No Vomiting; Appetite Good (Good)      Genitourinary:  No Nocturia      Musculoskeletal:  No Joint Effusions, No Joint Tenderness      Endocrine:  No Heat Intolerance      Hematologic:  No Bleeding      Allergic/Immunologic:  No Hives, No Throat closing off      Psychological:  No Anxiety      Neurological:  No Headaches      Skin:  No Rash, No Open Wounds            Vitals      Height 6 ft 0 in / 182.88 cm      Weight 205 lbs 3.2 oz / 93.75067 kg      BSA 2.15 m2      BMI 27.8 kg/m2      Temperature 97.0 F / 36.11 C      Pulse 81      Respirations 18      Blood Pressure 118/64      Pulse Oximetry 98%            Exam      Constitutional:  No acute distress,  Conversant, Pleasant      Eyes:  Anicteric sclerae, Palpebral Conjunctivae, RUDY (Pain)      Neck:  Supple, Full Range of Motion      Cardiovascular:  RRR; No Murmurs; Normal PMI; No Peripheral Edema      Lungs:  Clear to Ausculation, Normal Respiratory Effort      Abdomen:  Soft; No Masses, No Tenderness      Chest:  Other (Symmetrical and equal)      Lymphatic:  No Neck      Extremities:  No digital cyanosis, No digital ischemia, Normal gait, Other (No     deformity)      Neurological:  Cranial Nerve II-XII (Intact); No Focal Sensory deficits      Psychological:  Appropriate affect, Intact judgement, Alert      Skin:  Other (No dermatoses)            Lab Results      Creatinine 1.05, calcium 9, magnesium 2.5, phosphorus 2.9, GFR 72.61            Impression/Problem List      Prostate carcinoma with widespread bony metastases.  Had biochemical response     with decrease in PSA and subjective response with absence of pain      History of coronary artery disease      Hypertension      Hyperlipidemia      Notes      New Medications      * Calcium Carb/Vit D3 (CALCIUM 600-VIT D3 400 TABLET) 1 EACH TABLET: 1 EACH PO       BID #120      * Aspirin Chew (Aspirin Baby) 81 MG TAB.CHEW: 81 MG PO QDAY #30      * BICALUTAMIDE 50 MG TAB: 50 MG PO QDAY 90 Days #90      * Ketorolac Tromethamine (Toradol) 10 MG TABLET: 10 MG PO Q8HR #30         Instructions: 1 TAB      Discontinued Medications      * BICALUTAMIDE 50 MG TAB: 50 MG PO QDAY 30 Days #30            Plan      Lupron 22.5 mg every 3 months.  Start today.      Xgeva 120 mg subq monthly.      Calcium and vitamin D 1200 mg daily            Patient Education:        Prostate Cancer            PREVENTION      Hx Influenza Vaccination:  No      Influenza Vaccine Declined:  Yes      2 or More Falls in Past Year?:  No      Fall Past Year with Injury?:  No      Chart initiated by      ANTONIO Azevedo MA            Electronically signed by Melina Galloway  08/23/2020 18:34       Disclaimer:  Converted document may not contain table formatting or lab diagrams. Please see [employer] [facility] for the authenticated document.

## 2021-05-28 NOTE — PROGRESS NOTES
Patient: TELLY SALINAS     Acct: [id number]     Report: #[id number]  UNIT #: [id number]     : 1940    Encounter Date:2020  PRIMARY CARE:   ***Signed***  --------------------------------------------------------------------------------------------------------------------  TELEHEALTH NOTE      History of Present Illness            Chief Complaint: ()            Telly Salinas is presenting for evaluation via Telehealth visit. Verbal consent     obtained before beginning visit.            Provider spent (number of mins) minutes with the patient during telehealth     visit.            The following staff were present during the visit: ()                         Past Med History      New bone lesions in the pelvis with intractable pain      Hypertension      Coronary artery disease status post double bypass      Hyperlipidemia.      Overview of Symptoms      79-year-old white male was brought in by his son because of the intractable pain     on his right hip and thigh which disabled him from walking.  Patient is on     crutches.  Patient recent x-ray showed blastic lesion in the right ischium.      Patient was given ibuprofen 3 times a day and according to the son the patient     is able to walk without crutches.            Most Recent Lab Findings            CBC showed mild anemia with hemoglobin of 13/hematocrit 40.5 is to be noted     patient has been anemic since 2016.      Multiple myeloma work-up is negative      CMP is normal except for an elevated alkaline phosphatase of 330.            Allergies/Medications      Allergies:        Coded Allergies:             NO KNOWN ALLERGIES (Unverified , 8/9/15)      Medications    Last Reconciled on 20 18:20 by EDIE ROBERTS MD      Spironolactone (Aldactone) 25 Mg Tablet      25 MG PO QDAY, #30 TAB 0 Refills         Reported         20       Rosuvastatin Calcium (Crestor*) Unknown Strength Tablet      PO HS, #60 TAB 0 Refills          Reported         4/30/20       Furosemide (Furosemide) 40 Mg Tablet      40 MG PO QDAY, #30 TAB 0 Refills         Reported         4/30/20       Carvedilol (Coreg) 3.125 Mg Tab      3.125 MG PO BID, #60 TAB 5 Refills         Prov: EARLINE ANDRADE         8/11/15       Aspirin Chew (Aspirin Chew) 81 Mg Tab      81 MG PO QDAY, #60 TAB.CHEW 5 Refills         Prov: ANDRADE,AMAR         8/11/15            Plan/Instructions            * Plan Of Care: (      * Patient's possible diagnosis was discussed with the son, Neymar      )           Plan of treatment consisted of the following #1 urology referral     with Dr. Brown for May 8 at 2:30 PM                     #2 radiation therapy consultation with Dr. Sanders for Monday at      8:30 in the morning.                  Start patient on PPI because of the NSAID.                   Discussed the mainstay of treatment is androgen deprivation either     surgical or medical.  Awaiting urology consultation.      * Chronic conditions reviewed and taken into consideration for today's treatment      plan.      * Patient instructed to seek medical attention urgently for new or worsening       symptoms.      * Patient was educated/instructed on their diagnosis, treatment and medications       prior to discharge from the clinic today.      Codes:  Phone Eval 11-20 mi [id number]            Copies To:      Yoni Brown; Hakeem Sanders            Electronically signed by Melina Galloway  05/06/2020 16:14       Disclaimer: Converted document may not contain table formatting or lab diagrams. Please see [employer] [facility] for the authenticated document.

## 2021-06-28 NOTE — TELEPHONE ENCOUNTER
Called and spoke with patients son, Neymar. I made him aware  did recommend for him to go ahead and see medical oncology. Patient voiced understanding. I gave him the phone number for med onc & made him aware if patient starts having any urinary issues to let us know and we could see him for that.

## 2021-06-28 NOTE — TELEPHONE ENCOUNTER
Patient's son said Dr. Weeks did the prostate biopsy.  He said patient has prostate and bone cancer.  He wants to know if Dr. Weeks will see patient, and/or if he will refer to oncologist in [place].  Previously saw Dr. Galloway and then Dr. Keshawn Gutierrez.   Since [facility], they want him to go to [place], but son doesn't want to do so.

## 2021-08-02 PROBLEM — C61: Status: ACTIVE | Noted: 2020-09-13

## 2021-08-02 PROBLEM — C79.51 PRIMARY MALIGNANT NEOPLASM OF PROSTATE METASTATIC TO BONE (HCC): Status: ACTIVE | Noted: 2020-09-13

## 2021-08-02 PROBLEM — G89.3 CANCER ASSOCIATED PAIN: Status: ACTIVE | Noted: 2020-09-13

## 2021-08-02 NOTE — PROGRESS NOTES
Chief Complaint  Prostate Cancer and Follow-up    Keshawn Gutierrez*  Jacob Cruz, ELSA    Subjective          Hari Morillo presents to [facility] for establishment of care for metastatic prostate cancer.  This was identified 5/26/2020 when he was noted to have an elevated PSA.  Prostate biopsy is positive for adenocarcinoma.  He had staging evaluation which showed lesions in the bones.  His oncology records from [facility] requested.  By report, he received radiation although patient states he did not.  He was started on treatment with leuprolide, Casodex and Xgeva for bony involvement.  He has been on those medications since May of last year.  He states he is tolerating them well.  He does report some hot flashes and night sweats but nothing that he cannot tolerate.  He reports good urine stream without dysuria, hematuria or hesitancy.  He denies any masses or adenopathy.  He denies unusual aches or pains.  He reports adequate energy level for his daily needs.  He was previously seen by Dr. Javed and subsequently Dr. Gutierrez at [facility] but he wishes to establish here in [place].  He presents today for that purpose.    Oncology/Hematology History Overview Note   5/26/2020 Prostate adenocarcinoma, Downey score 9.      Lupron Q 3 months and Xgeva monthly at [facility].     Primary malignant neoplasm of prostate metastatic to bone (CMS/HCC)   9/13/2020 Initial Diagnosis    Primary malignant neoplasm of prostate metastatic to bone (CMS/HCC)     8/2/2021 -  Chemotherapy    OP PROSTATE Leuprolide 22.5 mg     8/2/2021 -  Chemotherapy    OP SUPPORTIVE Denosumab (Xgeva) Q28D     8/2/2021 Cancer Staged    Staging form: Prostate, AJCC 8th Edition  - Clinical: Stage IVB (pM1b) - Signed by Ernesto Trevino MD on 8/2/2021         Review of Systems   Constitutional: Negative for appetite change, diaphoresis, fatigue, fever, unexpected weight gain  and unexpected weight loss.   HENT: Positive for hearing loss. Negative for mouth sores, sore throat, swollen glands, trouble swallowing and voice change.    Eyes: Negative for blurred vision.   Respiratory: Negative for cough, shortness of breath and wheezing.    Cardiovascular: Negative for chest pain and palpitations.   Gastrointestinal: Negative for abdominal pain, blood in stool, constipation, diarrhea, nausea and vomiting.   Endocrine: Negative for cold intolerance and heat intolerance.   Genitourinary: Negative for difficulty urinating, dysuria, frequency, hematuria and urinary incontinence.   Musculoskeletal: Negative for arthralgias, back pain and myalgias.   Skin: Negative for rash, skin lesions and bruise.   Neurological: Negative for dizziness, seizures, weakness, numbness and headache.   Hematological: Does not bruise/bleed easily.   Psychiatric/Behavioral: Negative for depressed mood. The patient is not nervous/anxious.      Current Outpatient Medications on File Prior to Visit   Medication Sig Dispense Refill   • aspirin 81 MG chewable tablet aspirin 81 mg oral tablet,chewable chew 1 tablet (81 mg) by oral route once daily   Active     • calcium carbonate (OS-MASSIEL) 600 MG tablet Take 600 mg by mouth 2 (two) times a day.     • carvedilol (COREG) 3.125 MG tablet carvedilol 3.125 mg oral tablet take 1 tablet (3.125 mg) by oral route 2 times per day 2/1/2021  Active     • cyanocobalamin (VITAMIN B-12) 1000 MCG tablet Take 1,000 mcg by mouth Daily.     • furosemide (LASIX) 40 MG tablet      • rosuvastatin (CRESTOR) 10 MG tablet      • spironolactone (ALDACTONE) 25 MG tablet      • [DISCONTINUED] bicalutamide (CASODEX) 50 MG chemo tablet        No current facility-administered medications on file prior to visit.       No Known Allergies  Past Medical History:   Diagnosis Date   • Hypertension    • Prostate cancer (CMS/HCC)      Past Surgical History:   Procedure Laterality Date   • CORONARY ARTERY BYPASS  "BRING BACK FOR EXPLORATION       Social History     Socioeconomic History   • Marital status: Single     Spouse name: Not on file   • Number of children: Not on file   • Years of education: Not on file   • Highest education level: Not on file   Substance and Sexual Activity   • Alcohol use: Yes     Comment: VERY LITTLE     Family History   Problem Relation Age of Onset   • Heart attack Sister        Objective   Physical Exam  Vitals reviewed. Exam conducted with a chaperone present.   Constitutional:       General: He is not in acute distress.     Appearance: Normal appearance.   HENT:      Head: Normocephalic and atraumatic.   Eyes:      Conjunctiva/sclera: Conjunctivae normal.      Pupils: Pupils are equal, round, and reactive to light.   Cardiovascular:      Rate and Rhythm: Normal rate and regular rhythm.      Heart sounds: Normal heart sounds. No murmur heard.   No gallop.    Pulmonary:      Effort: Pulmonary effort is normal.      Breath sounds: Normal breath sounds.   Abdominal:      General: Abdomen is flat. Bowel sounds are normal. There is no distension.      Palpations: Abdomen is soft.      Tenderness: There is no abdominal tenderness.      Comments: No HSM   Musculoskeletal:      Cervical back: Neck supple. No tenderness.      Right lower leg: No edema.      Left lower leg: No edema.   Neurological:      Mental Status: He is alert and oriented to person, place, and time.   Psychiatric:         Mood and Affect: Mood normal.         Behavior: Behavior normal.         Vitals:    08/02/21 0757   BP: 111/77   Pulse: 83   Resp: 18   Temp: 97.1 °F (36.2 °C)   SpO2: 100%   Weight: 96.5 kg (212 lb 11.9 oz)   Height: 188 cm (74\")   PainSc: 0-No pain     ECOG score: 0         PHQ-9 Total Score: 0                  Result Review :   The following data was reviewed by: Ernesto Trevino MD on 08/02/2021:  Lab Results   Component Value Date    HGB 13.1 08/02/2021    HCT 40.6 08/02/2021    MCV 91.2 08/02/2021    PLT " 165 08/02/2021    WBC 6.72 08/02/2021    NEUTROABS 4.28 08/02/2021    LYMPHSABS 1.38 08/02/2021    MONOSABS 0.77 08/02/2021    EOSABS 0.24 08/02/2021    BASOSABS 0.04 08/02/2021     Lab Results   Component Value Date    GLUCOSE 102 (H) 08/02/2021    BUN 28 (H) 08/02/2021    CREATININE 1.51 (H) 08/02/2021     08/02/2021    K 4.1 08/02/2021     08/02/2021    CO2 27.2 08/02/2021    CALCIUM 9.7 08/02/2021    PROTEINTOT 6.9 08/02/2021    ALBUMIN 4.75 08/02/2021    BILITOT 0.7 08/02/2021    ALKPHOS 35 (L) 08/02/2021    AST 14 08/02/2021    ALT 8 08/02/2021           Assessment and Plan {CC Problem List  Visit Diagnosis   ROS  Review (Popup)  Real Matters Maintenance  Quality  BestPractice  Medications  SmartSets  SnapShot Encounters  Media :23}   Diagnoses and all orders for this visit:    1. Primary malignant neoplasm of prostate metastatic to bone (CMS/HCC) (Primary)  Assessment & Plan:  Biopsy-proven adenocarcinoma the prostate.  Metastatic to bones.  He is on leuprolide, Casodex, Xgeva for bony involvement.  He is tolerating well.  His most recent PSA from earlier this year was down to 0.4.  I do not have his original but family reports he was >100.  This would suggest very good clinical response thus far.  He has not had restaging scans.  I will order CT scans of the chest, abdomen, pelvis as well as bone scan to reassess his disease.  He will have lab work today including CBC, CMP, PSA.  He is due for leuprolide which will be continued 22.5 mg every 3 months.  He will continue monthly denosumab 120 mg subcu.  He denies dental or jaw pain.  He will continue Casodex 50 mg daily by mouth.  New prescription sent to his pharmacy.  I will see him back in 1 month for continued treatment.  I have requested records from his prior oncologist.      Orders:  -     PSA DIAGNOSTIC; Future  -     CBC & Differential; Future  -     Comprehensive Metabolic Panel; Future  -     Magnesium; Future  -     Phosphorus;  Future  -     CT chest w contrast; Future  -     CT abdomen pelvis w contrast; Future  -     NM Bone Scan Whole Body; Future  -     Infusion Appointment Request 10; Future  -     Cancel: leuprolide (LUPRON) injection 22.5 mg  -     CBC and Differential; Future  -     Comprehensive metabolic panel; Future  -     Magnesium; Future  -     Phosphorus; Future  -     Lab Appointment Request; Future  -     Clinic Appointment Request; Future  -     Infusion Appointment Request; Future  -     Cancel: denosumab (XGEVA) injection 120 mg          Patient Follow Up: 1 month with Xgeva    Patient was given instructions and counseling regarding his condition or for health maintenance advice. Please see specific information pulled into the AVS if appropriate.     Ernesto Trevino MD    8/2/2021

## 2021-08-02 NOTE — ASSESSMENT & PLAN NOTE
Biopsy-proven adenocarcinoma the prostate.  Metastatic to bones.  He is on leuprolide, Casodex, Xgeva for bony involvement.  He is tolerating well.  His most recent PSA from earlier this year was down to 0.4.  I do not have his original but family reports he was >100.  This would suggest very good clinical response thus far.  He has not had restaging scans.  I will order CT scans of the chest, abdomen, pelvis as well as bone scan to reassess his disease.  He will have lab work today including CBC, CMP, PSA.  He is due for leuprolide which will be continued 22.5 mg every 3 months.  He will continue monthly denosumab 120 mg subcu.  He denies dental or jaw pain.  He will continue Casodex 50 mg daily by mouth.  New prescription sent to his pharmacy.  I will see him back in 1 month for continued treatment.  I have requested records from his prior oncologist.

## 2021-08-30 NOTE — ASSESSMENT & PLAN NOTE
Metastatic.  Patient is on palliative treatment with leuprolide, Casodex, Xgeva for bony involvement.  Tolerating well with minimal side effects.  His PSA is slowly trending down.  He will continue Casodex by mouth daily.  Xgeva today and monthly for bony involvement.  I will see him back in October for his next leuprolide injection with lab work to monitor for toxicity and scans prior to assess response to therapy.

## 2021-08-30 NOTE — PROGRESS NOTES
Chief Complaint  Prostate Cancer and Follow-up    Jacob Cruz, Jacob Tom, ELSA Rivas          Hari Morillo presents to [facility] for ongoing treatment of his metastatic prostate cancer.  He is on palliative therapy with leuprolide, Casodex and Xgeva for bony involvement.  He denies problems or side effects from his medication.  Denies dental or jaw pain.  No new masses or adenopathy.  He reports normal urine stream without hesitancy, dysuria or hematuria.  He notes good appetite and energy level.    Oncology/Hematology History Overview Note   5/26/2020 Prostate adenocarcinoma, Dinorah score 9.      Lupron Q 3 months, Casodex and Xgeva monthly at [place].     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 Negative for appetite change, diaphoresis, fever, unexpected weight gain and unexpected weight loss.   HENT: Positive for hearing loss. Negative for mouth sores, sore throat, swollen glands, trouble swallowing and voice change.    Eyes: Negative for blurred vision.   Respiratory: Negative for cough, shortness of breath and wheezing.    Cardiovascular: Negative for chest pain and palpitations.   Gastrointestinal: Negative for abdominal pain, blood in stool, constipation, diarrhea, nausea and vomiting.   Endocrine: Negative for cold intolerance and heat intolerance.   Genitourinary: Negative for difficulty urinating, dysuria, frequency, hematuria and urinary incontinence.   Musculoskeletal:  Negative for arthralgias, back pain and myalgias.   Skin: Negative for rash, skin lesions and bruise.   Neurological: Negative for dizziness, seizures, weakness, numbness and headache.   Hematological: Does not bruise/bleed easily.   Psychiatric/Behavioral: Negative for depressed mood. The patient is not nervous/anxious.      Current Outpatient Medications on File Prior to Visit   Medication Sig Dispense Refill   • aspirin 81 MG chewable tablet aspirin 81 mg oral tablet,chewable chew 1 tablet (81 mg) by oral route once daily   Active     • bicalutamide (CASODEX) 50 MG chemo tablet Take 1 tablet by mouth Daily. 30 tablet 5   • calcium carbonate (OS-MASSIEL) 600 MG tablet Take 600 mg by mouth 2 (two) times a day.     • carvedilol (COREG) 3.125 MG tablet carvedilol 3.125 mg oral tablet take 1 tablet (3.125 mg) by oral route 2 times per day 2/1/2021  Active     • cyanocobalamin (VITAMIN B-12) 1000 MCG tablet Take 1,000 mcg by mouth Daily.     • furosemide (LASIX) 40 MG tablet      • rosuvastatin (CRESTOR) 10 MG tablet      • spironolactone (ALDACTONE) 25 MG tablet        Current Facility-Administered Medications on File Prior to Visit   Medication Dose Route Frequency Provider Last Rate Last Admin   • [COMPLETED] denosumab (XGEVA) injection 120 mg  120 mg Subcutaneous Once Ernesto Trevino MD   120 mg at 08/30/21 1139       No Known Allergies  Past Medical History:   Diagnosis Date   • CHF (congestive heart failure) (CMS/HCC) 12/06/2016   • Hypertension    • Prostate cancer (CMS/[facility])      Past Surgical History:   Procedure Laterality Date   • CORONARY ARTERY BYPASS BRING BACK FOR EXPLORATION      Triple coronary bypass     Social History     Socioeconomic History   • Marital status: Single     Spouse name: Not on file   • Number of children: Not on file   • Years of education: Not on file   • Highest education level: Not on file   Tobacco Use   • Smoking status: Current Every Day Smoker   • Smokeless tobacco: Current User      Types: Chew   • Tobacco comment: Chewa every day   Substance and Sexual Activity   • Alcohol use: Yes     Comment: VERY LITTLE     Family History   Problem Relation Age of Onset   • Heart attack Sister        Objective   Physical Exam  Vitals reviewed. Exam conducted with a chaperone present.   Constitutional:       General: He is not in acute distress.     Appearance: Normal appearance.   Cardiovascular:      Rate and Rhythm: Normal rate and regular rhythm.      Heart sounds: Normal heart sounds. No murmur heard.   No gallop.    Pulmonary:      Effort: Pulmonary effort is normal.      Breath sounds: Normal breath sounds.   Abdominal:      General: Abdomen is flat. Bowel sounds are normal. There is no distension.      Palpations: Abdomen is soft.      Tenderness: There is no abdominal tenderness.   Musculoskeletal:      Cervical back: Neck supple.      Right lower leg: No edema.      Left lower leg: No edema.   Lymphadenopathy:      Cervical: No cervical adenopathy.   Neurological:      Mental Status: He is alert and oriented to person, place, and time.   Psychiatric:         Mood and Affect: Mood normal.         Behavior: Behavior normal.         Vitals:    08/30/21 1038   BP: 103/71   Pulse: 87   Resp: 18   Temp: 98.7 °F (37.1 °C)   SpO2: 99%   Weight: 96.2 kg (212 lb 1.3 oz)   PainSc: 0-No pain     ECOG score: 0         PHQ-9 Total Score:                    Result Review :{Labs  Result Review  Imaging  Med Tab  Media  Procedures :23}   The following data was reviewed by: Ernesto Trevino MD on 08/30/2021:  Lab Results   Component Value Date    HGB 12.0 (L) 08/30/2021    HCT 37.0 (L) 08/30/2021    MCV 89.8 08/30/2021     08/30/2021    WBC 6.96 08/30/2021    NEUTROABS 4.75 08/30/2021    LYMPHSABS 1.28 08/30/2021    MONOSABS 0.69 08/30/2021    EOSABS 0.20 08/30/2021    BASOSABS 0.03 08/30/2021     Lab Results   Component Value Date    GLUCOSE 109 (H) 08/30/2021    BUN 18 08/30/2021    CREATININE  1.40 (H) 08/30/2021     08/30/2021    K 3.9 08/30/2021     08/30/2021    CO2 22.2 08/30/2021    CALCIUM 9.2 08/30/2021    PROTEINTOT 6.6 08/30/2021    ALBUMIN 4.59 08/30/2021    BILITOT 0.9 08/30/2021    ALKPHOS 35 (L) 08/30/2021    AST 13 08/30/2021    ALT 6 08/30/2021           Assessment and Plan    Diagnoses and all orders for this visit:    1. 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  Patient is on palliative treatment with leuprolide, Casodex, Xgeva for bony involvement.  Tolerating well with minimal side effects.  His PSA is slowly trending down.  He will continue Casodex by mouth daily.  Xgeva today and monthly for bony involvement.  I will see him back in October for his next leuprolide injection with lab work to monitor for toxicity and scans prior to assess response to therapy.    Orders:  -     CT Chest With Contrast; Future  -     CBC and Differential; Future  -     Comprehensive metabolic panel; Future  -     Magnesium; Future  -     Phosphorus; Future  -     CBC and Differential; Future  -     Comprehensive metabolic panel; Future  -     Magnesium; Future  -     Phosphorus; Future          Patient Follow Up: October with his next leuprolide    Patient was given instructions and counseling regarding his condition or for health maintenance advice. Please see specific information pulled into the AVS if appropriate.     Ernesto Trevino MD    8/30/2021

## 2021-10-25 NOTE — PROGRESS NOTES
Chief Complaint  Prostate Cancer (-F1)    Jacob Cruz, Jacob Tom, ELSA Rivas          Hari Morillo presents to [facility] for ongoing treatment of his prostate cancer.  He is on leuprolide, Casodex and Xgeva.  He is tolerating the injections and the oral medication without difficulty.  He reports occasional hot flashes but not terribly bothersome to him.  He is reports adequate energy level-able to perform all of his ADLs.  He denies new masses or adenopathy.    Oncology/Hematology History Overview Note   5/26/2020 Prostate adenocarcinoma, Dinorah score 9.      Lupron Q 3 months, Casodex and Xgeva monthly at [place].     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   HENT: Negative for hearing loss, sore throat and voice change.    Eyes: Negative for blurred vision, double vision, pain, redness and visual disturbance.   Respiratory: Negative for cough, shortness of breath and wheezing.    Cardiovascular: Negative for chest pain, palpitations and leg swelling.   Endocrine: Negative for cold intolerance, heat intolerance, polydipsia and polyuria.   Genitourinary: Negative for decreased urine volume, difficulty urinating, frequency and urinary incontinence.   Musculoskeletal: Negative for arthralgias, back pain, joint swelling and myalgias.   Skin: Negative for color change, rash, skin lesions and bruise.    Neurological: Negative for dizziness, seizures, numbness and headache.   Hematological: Negative for adenopathy. Does not bruise/bleed easily.   Psychiatric/Behavioral: Negative for depressed mood. The patient is not nervous/anxious.    All other systems reviewed and are negative.    Current Outpatient Medications on File Prior to Visit   Medication Sig Dispense Refill   • aspirin 81 MG chewable tablet aspirin 81 mg oral tablet,chewable chew 1 tablet (81 mg) by oral route once daily   Active     • bicalutamide (CASODEX) 50 MG chemo tablet Take 1 tablet by mouth Daily. 30 tablet 5   • calcium carbonate (OS-MASSIEL) 600 MG tablet Take 600 mg by mouth 2 (two) times a day.     • carvedilol (COREG) 3.125 MG tablet carvedilol 3.125 mg oral tablet take 1 tablet (3.125 mg) by oral route 2 times per day 2/1/2021  Active     • cyanocobalamin (VITAMIN B-12) 1000 MCG tablet Take 1,000 mcg by mouth Daily.     • furosemide (LASIX) 40 MG tablet      • rosuvastatin (CRESTOR) 10 MG tablet      • spironolactone (ALDACTONE) 25 MG tablet        No current facility-administered medications on file prior to visit.       No Known Allergies  Past Medical History:   Diagnosis Date   • CHF (congestive heart failure) (HCC) 12/06/2016   • Hypertension    • Prostate cancer (HCC)      Past Surgical History:   Procedure Laterality Date   • CORONARY ARTERY BYPASS BRING BACK FOR EXPLORATION      Triple coronary bypass     Social History     Socioeconomic History   • Marital status: Single   Tobacco Use   • Smoking status: Current Every Day Smoker   • Smokeless tobacco: Current User     Types: Chew   • Tobacco comment: Chewa every day   Substance and Sexual Activity   • Alcohol use: Yes     Comment: VERY LITTLE     Family History   Problem Relation Age of Onset   • Heart attack Sister        Objective   Physical Exam  Vitals reviewed. Exam conducted with a chaperone present.   Constitutional:       General: He is not in acute distress.     Appearance:  Normal appearance.   Cardiovascular:      Rate and Rhythm: Normal rate and regular rhythm.      Heart sounds: Normal heart sounds. No murmur heard.  No gallop.    Pulmonary:      Effort: Pulmonary effort is normal.      Breath sounds: Normal breath sounds.   Abdominal:      General: Abdomen is flat. Bowel sounds are normal. There is no distension.      Palpations: Abdomen is soft.      Tenderness: There is no abdominal tenderness.   Musculoskeletal:      Cervical back: Neck supple.      Right lower leg: No edema.      Left lower leg: No edema.   Lymphadenopathy:      Cervical: No cervical adenopathy.   Neurological:      Mental Status: He is alert and oriented to person, place, and time.   Psychiatric:         Mood and Affect: Mood normal.         Behavior: Behavior normal.         Vitals:    10/25/21 0828   BP: 126/75   Pulse: 84   Resp: 18   Temp: 97.5 °F (36.4 °C)   SpO2: 99%   Weight: 98.5 kg (217 lb 2.5 oz)   PainSc: 0-No pain     ECOG score: 0         PHQ-9 Total Score:                    Result Review :   The following data was reviewed by: Ernesto Trevino MD on 10/25/2021:  Lab Results   Component Value Date    HGB 11.8 (L) 10/25/2021    HCT 36.7 (L) 10/25/2021    MCV 89.7 10/25/2021     10/25/2021    WBC 6.33 10/25/2021    NEUTROABS 4.12 10/25/2021    LYMPHSABS 1.35 10/25/2021    MONOSABS 0.61 10/25/2021    EOSABS 0.20 10/25/2021    BASOSABS 0.04 10/25/2021     Lab Results   Component Value Date    GLUCOSE 102 (H) 10/25/2021    BUN 15 10/25/2021    CREATININE 1.39 (H) 10/25/2021     10/25/2021    K 4.2 10/25/2021     10/25/2021    CO2 22.3 10/25/2021    CALCIUM 9.5 10/25/2021    PROTEINTOT 6.6 10/25/2021    ALBUMIN 4.60 10/25/2021    BILITOT 0.7 10/25/2021    ALKPHOS 36 (L) 10/25/2021    AST 13 10/25/2021    ALT 7 10/25/2021           Assessment and Plan    Diagnoses and all orders for this visit:    1. 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  Tolerating well.  His most recent PSA and scans have been stable.  Continue with current therapy.  I will see him back in 3 months time for ongoing treatment with lab work prior to monitor for toxicity and response to treatment    Orders:  -     PSA DIAGNOSTIC; Future          Patient Follow Up: 3 months    Patient was given instructions and counseling regarding his condition or for health maintenance advice. Please see specific information pulled into the AVS if appropriate.     Ernesto Trevino MD    10/25/2021

## 2021-10-25 NOTE — ASSESSMENT & PLAN NOTE
Patient is on leuprolide, Casodex, Xgeva for bony involvement.  Tolerating well.  His most recent PSA and scans have been stable.  Continue with current therapy.  I will see him back in 3 months time for ongoing treatment with lab work prior to monitor for toxicity and response to treatment

## 2021-12-11 PROBLEM — I25.810 CORONARY ARTERY DISEASE INVOLVING CORONARY BYPASS GRAFT OF NATIVE HEART WITHOUT ANGINA PECTORIS: Chronic | Status: ACTIVE | Noted: 2021-01-01

## 2021-12-11 PROBLEM — I10 HYPERTENSION, ESSENTIAL: Chronic | Status: ACTIVE | Noted: 2021-01-01

## 2021-12-11 PROBLEM — N18.31 STAGE 3A CHRONIC KIDNEY DISEASE: Chronic | Status: ACTIVE | Noted: 2021-01-01

## 2021-12-11 PROBLEM — E78.5 HYPERLIPIDEMIA: Status: ACTIVE | Noted: 2021-01-01

## 2021-12-11 PROBLEM — I10 HYPERTENSION, ESSENTIAL: Status: ACTIVE | Noted: 2021-01-01

## 2021-12-11 PROBLEM — E78.5 HYPERLIPIDEMIA: Chronic | Status: ACTIVE | Noted: 2021-01-01

## 2021-12-11 PROBLEM — I42.9 CARDIOMYOPATHY: Chronic | Status: ACTIVE | Noted: 2021-01-01

## 2021-12-11 PROBLEM — I42.9 CARDIOMYOPATHY (HCC): Status: ACTIVE | Noted: 2021-01-01

## 2021-12-11 PROBLEM — N18.31 STAGE 3A CHRONIC KIDNEY DISEASE (HCC): Status: ACTIVE | Noted: 2021-01-01

## 2021-12-13 NOTE — PATIENT INSTRUCTIONS
Restart carvedilol 3.125 mg twice daily.    Decrease furosemide to 20 mg half a tab a day.  Can take a whole tab if needed.

## 2021-12-13 NOTE — ASSESSMENT & PLAN NOTE
Class III stable.  When I decrease his Lasix to 20 mg half a tab daily.  Can take a whole tab as needed basis.  We are going to restart carvedilol 3.125 twice daily.

## 2021-12-13 NOTE — ASSESSMENT & PLAN NOTE
Low readings at times.  Reviewed I decrease his Lasix.  Restart carvedilol due to his cardiomyopathy.  Continue his spironolactone.  He monitors his blood pressures and he will call us and send us readings if it goes too low for him to tolerate.

## 2021-12-13 NOTE — PROGRESS NOTES
Chief Complaint  Coronary Artery Disease (Follow up with EKG)    Rob Morillo presents to [facility]  He is an 81-year-old white male comes in to evaluate his coronary disease, hyperlipidemia, cardiomyopathy and congestive heart failure.  Tinges to complain of shortness of breath is moderate even with mild exertion that is long-term and normal.  He stopped taking his carvedilol due to low blood pressures and feeling fatigued.  He also decreased his Lasix on his own.  But he denies any pedal edema. Denies chest pain,  palpitations,  dizziness, syncope, PND, and orthopnea.  Has had both Covid vaccines.  But he has never had a flu shot.  Is gained 4 pounds since his last visit.  Patient complains of frequent urination.              Past History:    Past Medical History:   Diagnosis Date   • Cardiomyopathy (HCC) 12/11/2021    (EF of 25-30% in January 2018   • CHF (congestive heart failure) ([facility]) 12/06/2016   • Coronary artery disease involving coronary bypass graft of native heart without angina pectoris 12/11/2021    with previous bypass surgery x3 in October 2015 (LIMA to LAD, SVG to 2nd diagonal, and SVG to 1st marginal)   • Hyperlipidemia 12/11/2021   • Hypertension    • Hypertension, essential 12/11/2021   • Prostate cancer (HCC)    • Stage 3a chronic kidney disease ([facility]) 12/11/2021        Family History: family history includes Heart attack in his sister.     Social History: reports that he has been smoking. His smokeless tobacco use includes chew. He reports current alcohol use. He reports that he does not use drugs.    Allergies: Patient has no known allergies.      Past Surgical History:   Procedure Laterality Date   • ARTERIAL BYPASS SURGERY     • CORONARY ARTERY BYPASS BRING BACK FOR EXPLORATION      Triple coronary bypass          Current Outpatient Medications:   •  aspirin 81 MG chewable tablet, aspirin 81 mg oral tablet,chewable chew 1 tablet (81 mg) by  "oral route once daily   Active, Disp: , Rfl:   •  bicalutamide (CASODEX) 50 MG chemo tablet, Take 1 tablet by mouth Daily., Disp: 30 tablet, Rfl: 5  •  calcium carbonate (OS-MASSIEL) 600 MG tablet, Take 600 mg by mouth 2 (two) times a day., Disp: , Rfl:   •  cyanocobalamin (VITAMIN B-12) 1000 MCG tablet, Take 1,000 mcg by mouth Daily., Disp: , Rfl:   •  rosuvastatin (CRESTOR) 10 MG tablet, , Disp: , Rfl:   •  spironolactone (ALDACTONE) 25 MG tablet, Take 1 tablet by mouth every morning, Disp: 30 tablet, Rfl: 0  •  carvedilol (COREG) 3.125 MG tablet, Take 1 tablet by mouth 2 (Two) Times a Day., Disp: 180 tablet, Rfl: 3  •  furosemide (LASIX) 20 MG tablet, Take half a tab daily.  Can take a whole tab on an as-needed basis., Disp: 90 tablet, Rfl: 3     Review of Systems   Constitutional: Positive for fatigue.   Respiratory: Positive for shortness of breath.    Cardiovascular: Negative for chest pain, palpitations and leg swelling.   Neurological: Negative for dizziness.   All other systems reviewed and are negative.       Objective     Physical Exam  Constitutional:       General: He is not in acute distress.     Appearance: Normal appearance. He is normal weight.      Comments: Very hard hearing accompanied by son   Neck:      Vascular: No carotid bruit.   Cardiovascular:      Rate and Rhythm: Normal rate and regular rhythm. Occasional extrasystoles are present.     Chest Wall: PMI is displaced.      Heart sounds: Heart sounds are distant. No murmur heard.  No S3 or S4 sounds.    Pulmonary:      Effort: Pulmonary effort is normal.      Breath sounds: Normal breath sounds.   Musculoskeletal:      Right lower leg: No edema.      Left lower leg: No edema.   Neurological:      Mental Status: He is alert.       /64 (Patient Position: Sitting)   Pulse 78   Ht 188 cm (74\")   Wt 95.3 kg (210 lb)   BMI 26.96 kg/m²       Vitals:    12/13/21 0750   BP: 104/64   Pulse: 78       Result Review :         The following data was " reviewed by: ELSA Perez on 12/13/2021:      No results found for: PROBNP, BNP  CMP    CMP 9/27/21 10/25/21 11/22/21   Glucose 127 (A) 102 (A) 109 (A)   BUN 26 (A) 15 24 (A)   Creatinine 1.54 (A) 1.39 (A) 1.29 (A)   eGFR Non African Am 44 (A) 49 (A) 54 (A)   Sodium 138 137 138   Potassium 3.8 4.2 3.9   Chloride 104 105 104   Calcium 9.5 9.5 9.0   Albumin 4.87 4.60 4.71   Total Bilirubin 0.8 0.7 0.8   Alkaline Phosphatase 34 (A) 36 (A) 39   AST (SGOT) 13 13 14   ALT (SGPT) 8 7 9   (A) Abnormal value            CBC w/diff    CBC w/Diff 9/27/21 10/25/21 11/22/21   WBC 6.88 6.33 6.19   RBC 4.50 4.09 (A) 4.17   Hemoglobin 12.9 (A) 11.8 (A) 11.9 (A)   Hematocrit 40.9 36.7 (A) 37.6   MCV 90.9 89.7 90.2   MCH 28.7 28.9 28.5   MCHC 31.5 32.2 31.6   RDW 15.0 15.8 (A) 15.9 (A)   Platelets 168 167 169   Neutrophil Rel % 64.7 65.1 64.1   Immature Granulocyte Rel % 0.4 0.2 0.3   Lymphocyte Rel % 20.9 21.3 21.6   Monocyte Rel % 9.9 9.6 10.0   Eosinophil Rel % 3.8 3.2 3.7   Basophil Rel % 0.3 0.6 0.3   (A) Abnormal value             Lipids on December 6 BUN is 20 creatinine 1.3, total class was 117 triglycerides 71 LDL 48 HDL 57      Magnesium   Date Value Ref Range Status   11/22/2021 2.4 1.6 - 2.4 mg/dL Final             ECG 12 Lead    Date/Time: 12/13/2021 8:52 AM  Performed by: Devika Deutsch MD  Authorized by: Devika Deutsch MD   Comments: Sinus rhythm left bundle branch block, PVCs.  Compared to previous EKG of 3/11/2019 the PVCs are new.                Assessment and Plan        Diagnoses and all orders for this visit:    1. Coronary artery disease involving coronary bypass graft of native heart without angina pectoris (Primary)  Assessment & Plan:  Stable without angina.  Continue aspirin 81 mg once a day.      2. Cardiomyopathy, unspecified type (HCC)  Assessment & Plan:  Shortness of breath is unchanged.  We are going to restart carvedilol 3.125 twice daily.  Due to frequent urination and hypotensive episodes  can decrease his Lasix to 20 mg half a tab a day.  Can take a whole tab on an as-needed basis.  Written and verbal instruction were given to son and patient understands.  Continue spironolactone 25 every day    Orders:  -     carvedilol (COREG) 3.125 MG tablet; Take 1 tablet by mouth 2 (Two) Times a Day.  Dispense: 180 tablet; Refill: 3  -     furosemide (LASIX) 20 MG tablet; Take half a tab daily.  Can take a whole tab on an as-needed basis.  Dispense: 90 tablet; Refill: 3    3. Chronic combined systolic and diastolic congestive heart failure (HCC)  Assessment & Plan:  Class III stable.  When I decrease his Lasix to 20 mg half a tab daily.  Can take a whole tab as needed basis.  We are going to restart carvedilol 3.125 twice daily.    Orders:  -     carvedilol (COREG) 3.125 MG tablet; Take 1 tablet by mouth 2 (Two) Times a Day.  Dispense: 180 tablet; Refill: 3  -     furosemide (LASIX) 20 MG tablet; Take half a tab daily.  Can take a whole tab on an as-needed basis.  Dispense: 90 tablet; Refill: 3    4. Stage 3a chronic kidney disease (HCC)  Assessment & Plan:  Chronic kidney disease improving.  Continue to avoid NSAIDs.      5. Hypertension, essential  Assessment & Plan:  Low readings at times.  Reviewed I decrease his Lasix.  Restart carvedilol due to his cardiomyopathy.  Continue his spironolactone.  He monitors his blood pressures and he will call us and send us readings if it goes too low for him to tolerate.    Orders:  -     Comprehensive Metabolic Panel; Future  -     Magnesium; Future    6. Hyperlipidemia, unspecified hyperlipidemia type  Assessment & Plan:  Controlled.  Continue rosuvastatin 10 mg    Orders:  -     Lipid Panel; Future  -     Comprehensive Metabolic Panel; Future            Follow Up     Return in about 6 months (around 6/13/2022) for with Dr. Deutsch, With external labs.    Patient was given instructions and counseling regarding his condition or for health maintenance advice. Please see  specific information pulled into the AVS if appropriate.       ELSA Mae  12/13/21 08:03 EST

## 2021-12-13 NOTE — ASSESSMENT & PLAN NOTE
Shortness of breath is unchanged.  We are going to restart carvedilol 3.125 twice daily.  Due to frequent urination and hypotensive episodes can decrease his Lasix to 20 mg half a tab a day.  Can take a whole tab on an as-needed basis.  Written and verbal instruction were given to son and patient understands.  Continue spironolactone 25 every day

## 2021-12-27 NOTE — TELEPHONE ENCOUNTER
Timothy called and states that the pt is c/o bone pain 10/10. The pt states that the Hydrocodone is not helping his pain. Timothy states aleida pt needs something different for the pain.

## 2021-12-28 PROBLEM — J96.01 ACUTE RESPIRATORY FAILURE WITH HYPOXIA (HCC): Status: ACTIVE | Noted: 2021-01-01

## 2021-12-29 NOTE — TELEPHONE ENCOUNTER
Called and spoke with patients jacqui Aguirre. I apologized for the delay in returning his call. I explained to him that Dr. Trevino was out of the office and that Dr. Quigley who was covering felt that his father should return to the ED for further evaluation. She did not feel comfortable increasing his pain medications at this time, as the ED provider had noted that his father appeared to be really drowsy, that and his diagnosis of pneumonia was a concern that he should return to the ED. He said that his father would not return to the ED they did nothing for his pain and stated that we are not helping either. I explained that his father having pneumonia was compromising his respiratory status and that increasing his narcotics while they may be making him sleepy will increase his risk. He needed to be evaluated in the ED  where they could manage his pain and monitor the patient.  He said that he had been told he could give his father the pain pills every 4 hours instead of six and that he would try that , and figure something out. I told him that I would could get his father into to be evaluated by the NP next week and he declined saying that wasn't going to help him , son said to cancel all of his appts.

## 2021-12-29 NOTE — TELEPHONE ENCOUNTER
During the previous phone call discussed with patients jacqui Aguirre- patient pain level told son that the goal of the pain meds are to reduce his fathers pain unfortunately he will probably never be at a level of ) , but we can get him to a level where he is comfortable. Did he feel that when he gave him the meds every 4 hours did he seem more comfortable. Son replied so are you telling me that I should have called hospice instead of you guys. I said sir that it not what I am saying. I am trying to understand your fathers pain level.

## 2021-12-29 NOTE — TELEPHONE ENCOUNTER
Timothy called back. Timothy is very upset that no one called him back about the pt's 10/10 bone pain. Timothy also states that [facility] did not have any beds available for the pt when they were in the ER and the pt was being admitted for pneumonia. The pt did not want to wait in the ER so he went home. Timothy states that the pt yelled the whole way home due to his bone pain. pt's bone pain is still a 10/10. The pt's pain was not addressed in the ER, only the pneumonia. Timothy is requesting to speak directly to Dr Quigley or her Nurse.

## 2022-01-01 ENCOUNTER — TELEPHONE (OUTPATIENT)
Dept: ONCOLOGY | Facility: HOSPITAL | Age: 82
End: 2022-01-01

## 2022-01-01 ENCOUNTER — LAB (OUTPATIENT)
Dept: ONCOLOGY | Facility: HOSPITAL | Age: 82
End: 2022-01-01

## 2022-01-01 ENCOUNTER — OFFICE VISIT (OUTPATIENT)
Dept: ONCOLOGY | Facility: HOSPITAL | Age: 82
End: 2022-01-01

## 2022-01-01 ENCOUNTER — APPOINTMENT (OUTPATIENT)
Dept: ONCOLOGY | Facility: HOSPITAL | Age: 82
End: 2022-01-01

## 2022-01-01 ENCOUNTER — HOSPITAL ENCOUNTER (OUTPATIENT)
Dept: ONCOLOGY | Facility: HOSPITAL | Age: 82
Setting detail: INFUSION SERIES
Discharge: HOME | End: 2022-01-17

## 2022-01-01 VITALS
DIASTOLIC BLOOD PRESSURE: 109 MMHG | TEMPERATURE: 96.8 F | BODY MASS INDEX: 27.12 KG/M2 | SYSTOLIC BLOOD PRESSURE: 160 MMHG | OXYGEN SATURATION: 97 % | HEART RATE: 110 BPM | RESPIRATION RATE: 22 BRPM | WEIGHT: 211.2 LBS

## 2022-01-01 VITALS
TEMPERATURE: 97 F | DIASTOLIC BLOOD PRESSURE: 63 MMHG | HEART RATE: 81 BPM | RESPIRATION RATE: 18 BRPM | SYSTOLIC BLOOD PRESSURE: 92 MMHG

## 2022-01-01 VITALS
HEART RATE: 50 BPM | BODY MASS INDEX: 25.76 KG/M2 | RESPIRATION RATE: 18 BRPM | WEIGHT: 200.62 LBS | DIASTOLIC BLOOD PRESSURE: 66 MMHG | TEMPERATURE: 96.8 F | OXYGEN SATURATION: 96 % | SYSTOLIC BLOOD PRESSURE: 92 MMHG

## 2022-01-01 DIAGNOSIS — C61 PRIMARY MALIGNANT NEOPLASM OF PROSTATE METASTATIC TO BONE: Primary | ICD-10-CM

## 2022-01-01 DIAGNOSIS — R06.00 DYSPNEA, UNSPECIFIED TYPE: ICD-10-CM

## 2022-01-01 DIAGNOSIS — C79.51 PRIMARY MALIGNANT NEOPLASM OF PROSTATE METASTATIC TO BONE: Primary | ICD-10-CM

## 2022-01-01 DIAGNOSIS — C79.51 PRIMARY MALIGNANT NEOPLASM OF PROSTATE METASTATIC TO BONE: ICD-10-CM

## 2022-01-01 DIAGNOSIS — G89.3 CANCER ASSOCIATED PAIN: ICD-10-CM

## 2022-01-01 DIAGNOSIS — G89.3 CANCER ASSOCIATED PAIN: Primary | ICD-10-CM

## 2022-01-01 DIAGNOSIS — C61 PRIMARY MALIGNANT NEOPLASM OF PROSTATE METASTATIC TO BONE: ICD-10-CM

## 2022-01-01 DIAGNOSIS — G47.00 INSOMNIA, UNSPECIFIED TYPE: Primary | ICD-10-CM

## 2022-01-01 DIAGNOSIS — M89.8X9 BONE PAIN: ICD-10-CM

## 2022-01-01 DIAGNOSIS — C61 PROSTATE CANCER: ICD-10-CM

## 2022-01-01 DIAGNOSIS — R11.0 NAUSEA: ICD-10-CM

## 2022-01-01 DIAGNOSIS — C61 PROSTATE CANCER: Primary | ICD-10-CM

## 2022-01-01 LAB
ALBUMIN SERPL-MCNC: 4.33 G/DL (ref 3.5–5.2)
ALBUMIN SERPL-MCNC: 4.5 G/DL (ref 3.5–5.2)
ALBUMIN/GLOB SERPL: 2 G/DL
ALBUMIN/GLOB SERPL: 2.1 G/DL
ALP SERPL-CCNC: 54 U/L (ref 39–117)
ALP SERPL-CCNC: 64 U/L (ref 39–117)
ALT SERPL W P-5'-P-CCNC: 22 U/L (ref 1–41)
ALT SERPL W P-5'-P-CCNC: 22 U/L (ref 1–41)
ANION GAP SERPL CALCULATED.3IONS-SCNC: 13.6 MMOL/L (ref 5–15)
ANION GAP SERPL CALCULATED.3IONS-SCNC: 8.3 MMOL/L (ref 5–15)
AST SERPL-CCNC: 25 U/L (ref 1–40)
AST SERPL-CCNC: 29 U/L (ref 1–40)
BACTERIA SPEC AEROBE CULT: NORMAL
BACTERIA SPEC AEROBE CULT: NORMAL
BASOPHILS # BLD AUTO: 0.03 10*3/MM3 (ref 0–0.2)
BASOPHILS # BLD AUTO: 0.05 10*3/MM3 (ref 0–0.2)
BASOPHILS NFR BLD AUTO: 0.5 % (ref 0–1.5)
BASOPHILS NFR BLD AUTO: 0.6 % (ref 0–1.5)
BILIRUB SERPL-MCNC: 1.1 MG/DL (ref 0–1.2)
BILIRUB SERPL-MCNC: 1.1 MG/DL (ref 0–1.2)
BUN SERPL-MCNC: 21 MG/DL (ref 8–23)
BUN SERPL-MCNC: 34 MG/DL (ref 8–23)
BUN/CREAT SERPL: 19.1 (ref 7–25)
BUN/CREAT SERPL: 9.7 (ref 7–25)
CALCIUM SPEC-SCNC: 8.7 MG/DL (ref 8.6–10.5)
CALCIUM SPEC-SCNC: 9.5 MG/DL (ref 8.6–10.5)
CHLORIDE SERPL-SCNC: 101 MMOL/L (ref 98–107)
CHLORIDE SERPL-SCNC: 104 MMOL/L (ref 98–107)
CO2 SERPL-SCNC: 23.4 MMOL/L (ref 22–29)
CO2 SERPL-SCNC: 24.7 MMOL/L (ref 22–29)
CREAT SERPL-MCNC: 1.78 MG/DL (ref 0.76–1.27)
CREAT SERPL-MCNC: 2.17 MG/DL (ref 0.76–1.27)
DEPRECATED RDW RBC AUTO: 57.7 FL (ref 37–54)
DEPRECATED RDW RBC AUTO: 61.8 FL (ref 37–54)
EOSINOPHIL # BLD AUTO: 0.03 10*3/MM3 (ref 0–0.4)
EOSINOPHIL # BLD AUTO: 0.1 10*3/MM3 (ref 0–0.4)
EOSINOPHIL NFR BLD AUTO: 0.4 % (ref 0.3–6.2)
EOSINOPHIL NFR BLD AUTO: 1.6 % (ref 0.3–6.2)
ERYTHROCYTE [DISTWIDTH] IN BLOOD BY AUTOMATED COUNT: 18.4 % (ref 12.3–15.4)
ERYTHROCYTE [DISTWIDTH] IN BLOOD BY AUTOMATED COUNT: 18.9 % (ref 12.3–15.4)
GFR SERPL CREATININE-BSD FRML MDRD: 29 ML/MIN/1.73
GFR SERPL CREATININE-BSD FRML MDRD: 37 ML/MIN/1.73
GLOBULIN UR ELPH-MCNC: 2.1 GM/DL
GLOBULIN UR ELPH-MCNC: 2.2 GM/DL
GLUCOSE SERPL-MCNC: 111 MG/DL (ref 65–99)
GLUCOSE SERPL-MCNC: 115 MG/DL (ref 65–99)
HCT VFR BLD AUTO: 40.4 % (ref 37.5–51)
HCT VFR BLD AUTO: 43.3 % (ref 37.5–51)
HGB BLD-MCNC: 12.9 G/DL (ref 13–17.7)
HGB BLD-MCNC: 13.3 G/DL (ref 13–17.7)
IMM GRANULOCYTES # BLD AUTO: 0.01 10*3/MM3 (ref 0–0.05)
IMM GRANULOCYTES # BLD AUTO: 0.03 10*3/MM3 (ref 0–0.05)
IMM GRANULOCYTES NFR BLD AUTO: 0.2 % (ref 0–0.5)
IMM GRANULOCYTES NFR BLD AUTO: 0.4 % (ref 0–0.5)
LYMPHOCYTES # BLD AUTO: 1.17 10*3/MM3 (ref 0.7–3.1)
LYMPHOCYTES # BLD AUTO: 1.17 10*3/MM3 (ref 0.7–3.1)
LYMPHOCYTES NFR BLD AUTO: 14.4 % (ref 19.6–45.3)
LYMPHOCYTES NFR BLD AUTO: 18.7 % (ref 19.6–45.3)
MAGNESIUM SERPL-MCNC: 2.8 MG/DL (ref 1.6–2.4)
MCH RBC QN AUTO: 27.6 PG (ref 26.6–33)
MCH RBC QN AUTO: 28.7 PG (ref 26.6–33)
MCHC RBC AUTO-ENTMCNC: 30.7 G/DL (ref 31.5–35.7)
MCHC RBC AUTO-ENTMCNC: 31.9 G/DL (ref 31.5–35.7)
MCV RBC AUTO: 89.8 FL (ref 79–97)
MCV RBC AUTO: 89.8 FL (ref 79–97)
MONOCYTES # BLD AUTO: 0.91 10*3/MM3 (ref 0.1–0.9)
MONOCYTES # BLD AUTO: 0.95 10*3/MM3 (ref 0.1–0.9)
MONOCYTES NFR BLD AUTO: 11.2 % (ref 5–12)
MONOCYTES NFR BLD AUTO: 15.2 % (ref 5–12)
NEUTROPHILS NFR BLD AUTO: 3.99 10*3/MM3 (ref 1.7–7)
NEUTROPHILS NFR BLD AUTO: 5.95 10*3/MM3 (ref 1.7–7)
NEUTROPHILS NFR BLD AUTO: 63.8 % (ref 42.7–76)
NEUTROPHILS NFR BLD AUTO: 73 % (ref 42.7–76)
NRBC BLD AUTO-RTO: 0.6 /100 WBC (ref 0–0.2)
PHOSPHATE SERPL-MCNC: 2.6 MG/DL (ref 2.5–4.5)
PLATELET # BLD AUTO: 161 10*3/MM3 (ref 140–450)
PLATELET # BLD AUTO: 185 10*3/MM3 (ref 140–450)
PMV BLD AUTO: 10.7 FL (ref 6–12)
PMV BLD AUTO: 11.6 FL (ref 6–12)
POTASSIUM SERPL-SCNC: 3.8 MMOL/L (ref 3.5–5.2)
POTASSIUM SERPL-SCNC: 4.6 MMOL/L (ref 3.5–5.2)
PROT SERPL-MCNC: 6.4 G/DL (ref 6–8.5)
PROT SERPL-MCNC: 6.7 G/DL (ref 6–8.5)
PSA SERPL-MCNC: 2.81 NG/ML (ref 0–4)
PSA SERPL-MCNC: 2.89 NG/ML (ref 0–4)
QT INTERVAL: 476 MS
RBC # BLD AUTO: 4.5 10*6/MM3 (ref 4.14–5.8)
RBC # BLD AUTO: 4.82 10*6/MM3 (ref 4.14–5.8)
SODIUM SERPL-SCNC: 137 MMOL/L (ref 136–145)
SODIUM SERPL-SCNC: 138 MMOL/L (ref 136–145)
WBC NRBC COR # BLD: 6.25 10*3/MM3 (ref 3.4–10.8)
WBC NRBC COR # BLD: 8.14 10*3/MM3 (ref 3.4–10.8)

## 2022-01-01 PROCEDURE — 80053 COMPREHEN METABOLIC PANEL: CPT

## 2022-01-01 PROCEDURE — 96402 CHEMO HORMON ANTINEOPL SQ/IM: CPT

## 2022-01-01 PROCEDURE — 36415 COLL VENOUS BLD VENIPUNCTURE: CPT

## 2022-01-01 PROCEDURE — 96372 THER/PROPH/DIAG INJ SC/IM: CPT

## 2022-01-01 PROCEDURE — G0463 HOSPITAL OUTPT CLINIC VISIT: HCPCS

## 2022-01-01 PROCEDURE — 85025 COMPLETE CBC W/AUTO DIFF WBC: CPT

## 2022-01-01 PROCEDURE — G0463 HOSPITAL OUTPT CLINIC VISIT: HCPCS | Performed by: NURSE PRACTITIONER

## 2022-01-01 PROCEDURE — 84100 ASSAY OF PHOSPHORUS: CPT

## 2022-01-01 PROCEDURE — 99214 OFFICE O/P EST MOD 30 MIN: CPT | Performed by: NURSE PRACTITIONER

## 2022-01-01 PROCEDURE — 84153 ASSAY OF PSA TOTAL: CPT

## 2022-01-01 PROCEDURE — 99214 OFFICE O/P EST MOD 30 MIN: CPT | Performed by: INTERNAL MEDICINE

## 2022-01-01 PROCEDURE — 25010000002 LEUPROLIDE ACETATE (3 MONTH) PER 7.5 MG: Performed by: INTERNAL MEDICINE

## 2022-01-01 PROCEDURE — 25010000002 DENOSUMAB 120 MG/1.7ML SOLUTION: Performed by: INTERNAL MEDICINE

## 2022-01-01 PROCEDURE — 83735 ASSAY OF MAGNESIUM: CPT

## 2022-01-01 RX ORDER — DOXEPIN HYDROCHLORIDE 10 MG/1
10 CAPSULE ORAL NIGHTLY
Qty: 30 CAPSULE | Refills: 0 | Status: SHIPPED | OUTPATIENT
Start: 2022-01-01 | End: 2022-02-24

## 2022-01-01 RX ORDER — MORPHINE SULFATE 30 MG/1
30 TABLET, FILM COATED, EXTENDED RELEASE ORAL 2 TIMES DAILY
Qty: 60 TABLET | Refills: 0 | Status: SHIPPED | OUTPATIENT
Start: 2022-01-01 | End: 2022-01-01 | Stop reason: SDUPTHER

## 2022-01-01 RX ORDER — HYDROCODONE BITARTRATE AND ACETAMINOPHEN 5; 325 MG/1; MG/1
1 TABLET ORAL EVERY 6 HOURS PRN
Qty: 60 TABLET | Refills: 0 | Status: SHIPPED | OUTPATIENT
Start: 2022-01-01 | End: 2022-01-01 | Stop reason: SDUPTHER

## 2022-01-01 RX ORDER — MORPHINE SULFATE 15 MG/1
15 TABLET, FILM COATED, EXTENDED RELEASE ORAL 2 TIMES DAILY
Qty: 60 TABLET | Refills: 0 | Status: SHIPPED | OUTPATIENT
Start: 2022-01-01 | End: 2022-01-01

## 2022-01-01 RX ORDER — HYDROCODONE BITARTRATE AND ACETAMINOPHEN 5; 325 MG/1; MG/1
1 TABLET ORAL EVERY 6 HOURS PRN
Qty: 60 TABLET | Refills: 0 | Status: SHIPPED | OUTPATIENT
Start: 2022-01-01 | End: 2022-02-23

## 2022-01-01 RX ORDER — SPIRONOLACTONE 25 MG/1
TABLET ORAL
Qty: 30 TABLET | Refills: 0 | OUTPATIENT
Start: 2022-01-01

## 2022-01-01 RX ORDER — ONDANSETRON 8 MG/1
8 TABLET, ORALLY DISINTEGRATING ORAL EVERY 8 HOURS PRN
Qty: 60 TABLET | Refills: 1 | Status: SHIPPED | OUTPATIENT
Start: 2022-01-01 | End: 2022-02-09

## 2022-01-01 RX ORDER — MORPHINE SULFATE 30 MG/1
30 TABLET, FILM COATED, EXTENDED RELEASE ORAL 2 TIMES DAILY
Qty: 60 TABLET | Refills: 0 | Status: SHIPPED | OUTPATIENT
Start: 2022-01-01

## 2022-01-01 RX ADMIN — LEUPROLIDE ACETATE 22.5 MG: KIT at 13:39

## 2022-01-01 RX ADMIN — DENOSUMAB 120 MG: 120 INJECTION SUBCUTANEOUS at 13:40

## 2022-01-10 NOTE — TELEPHONE ENCOUNTER
Timothy called and states that the pt still has 10/10 bone pain and is asking if the pt could be seen by Dr Trevino today. This nurse called Maddison PENNINGTON. Dr Trevino is booked for the day but has a 0800 apt open tomorrow morning ot the pt could come in and see Sonia HUNTER at 1400 today. This nurse gave the two options to Timothy and he choose to have the pt come in at 1400 to see Sonia. This nurse called the Clinic desk to have the pt added to the schedule.

## 2022-01-10 NOTE — PROGRESS NOTES
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     History of Present Illness     Mr. Hari Morillo presents with his son for an acute care visit for worsening bone pain and shortness of breath. Patient has metastatic prostrate cancer and receives Lupron every 3 months, Casodex and monthly Xgeva for the bone mets. He had a bone scan don in mid-August which showed widespread osseous metastatic disease with increased uptake within the sternum, ribs, spine an bony pelvis. He has been taking Hydrocodone 5 mg 1 tablet every 4-6 hours.     Was seen in the ER on 12/28/21 for diffuse body pain. He has a signifcant history for CHF and had a BNP of almost 12,000 on this date. CXR was done and showed focal opacity within the RLL and with small right pleural effusion.     Son is with father today and reports ER wanted to keep him, but his father would not stay. Reports loss of appetite for the last 3-4 weeks and feels like symptoms are worsening. Reports drinking PRN Ensure. Was given Levaquin QOD for the pnemonia secondary to CRF. Last PSA was rising from August of 0.766 to 1.78 in October. He is due for next treatment on Monday, January 17,2022.     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      Lupron Q 3 months, Casodex and Xgeva monthly at [place].     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 Negative for diaphoresis, fever and unexpected weight gain.   HENT: Negative for hearing loss, mouth sores, sore throat, swollen glands, trouble swallowing and voice change.    Eyes: Negative for blurred vision.   Respiratory: Positive for shortness of breath. Negative for cough and wheezing.    Cardiovascular: Positive for leg swelling. Negative for chest pain and palpitations.   Gastrointestinal: Positive for abdominal distention (stomach and legs swollen), nausea and vomiting. Negative for abdominal pain, blood in stool, constipation and diarrhea.   Endocrine: Negative for cold intolerance and heat intolerance.   Genitourinary: Negative for difficulty urinating, dysuria, frequency, hematuria and urinary incontinence.   Musculoskeletal: Positive for arthralgias (bone pain- generalized). Negative for back pain and myalgias.   Skin: Negative for rash, skin lesions and bruise.   Neurological: Positive for weakness and light-headedness. Negative for dizziness, seizures, numbness and headache.   Hematological: Does not bruise/bleed easily.   Psychiatric/Behavioral: Positive for sleep disturbance. Negative for depressed mood. The patient is not nervous/anxious.    All other systems reviewed and are negative.      Current Outpatient Medications on File Prior to Visit   Medication Sig Dispense Refill   • albuterol sulfate  (90 Base) MCG/ACT inhaler Inhale 2 puffs Every 4 (Four) Hours As Needed for Wheezing. 6.7 g 0   • aspirin 81 MG chewable tablet Chew 81 mg Daily.     • bicalutamide  (CASODEX) 50 MG chemo tablet Take 1 tablet by mouth Daily. 30 tablet 5   • calcium carbonate (OS-MASSIEL) 600 MG tablet Take 600 mg by mouth 2 (two) times a day.     • cyanocobalamin (VITAMIN B-12) 1000 MCG tablet Take 1,000 mcg by mouth Daily.     • rosuvastatin (CRESTOR) 20 MG tablet Take 20 mg by mouth Daily.     • [DISCONTINUED] HYDROcodone-acetaminophen (NORCO) 5-325 MG per tablet Take 1 tablet by mouth Every 6 (Six) Hours As Needed (pain) for up to 30 days. 120 tablet 0     No current facility-administered medications on file prior to visit.       No Known Allergies  Past Medical History:   Diagnosis Date   • Cardiomyopathy ([facility]) 12/11/2021    (EF of 25-30% in January 2018   • CHF (congestive heart failure) ([facility]) 12/06/2016   • Coronary artery disease involving coronary bypass graft of native heart without angina pectoris 12/11/2021    with previous bypass surgery x3 in October 2015 (LIMA to LAD, SVG to 2nd diagonal, and SVG to 1st marginal)   • Hyperlipidemia 12/11/2021   • Hypertension    • Hypertension, essential 12/11/2021   • Prostate cancer ([facility])    • Stage 3a chronic kidney disease ([facility]) 12/11/2021     Past Surgical History:   Procedure Laterality Date   • ARTERIAL BYPASS SURGERY     • CORONARY ARTERY BYPASS BRING BACK FOR EXPLORATION      Triple coronary bypass     Social History     Socioeconomic History   • Marital status: Single   Tobacco Use   • Smoking status: Current Every Day Smoker   • Smokeless tobacco: Current User     Types: Chew   • Tobacco comment: [place] every day   Vaping Use   • Vaping Use: Never used   Substance and Sexual Activity   • Alcohol use: Yes     Comment: VERY LITTLE   • Drug use: Never   • Sexual activity: Defer     Family History   Problem Relation Age of Onset   • Heart attack Sister      Immunization History   Administered Date(s) Administered   • COVID-19 (MODERNA) 1st, 2nd, 3rd Dose Only 02/24/2021, 03/17/2021   • COVID-19 (PFIZER) 02/24/2021, 03/17/2021, 09/27/2021        Objective   Physical Exam  Vitals and nursing note reviewed.   Constitutional:       Appearance: He is normal weight.   HENT:      Head: Normocephalic.      Nose: Nose normal.      Mouth/Throat:      Mouth: Mucous membranes are moist.   Eyes:      Pupils: Pupils are equal, round, and reactive to light.   Cardiovascular:      Rate and Rhythm: Normal rate and regular rhythm.      Pulses: Normal pulses.      Heart sounds: Normal heart sounds. No murmur heard.      Pulmonary:      Effort: Pulmonary effort is normal.      Breath sounds: Normal breath sounds. No wheezing, rhonchi or rales.   Abdominal:      General: Bowel sounds are normal. There is no distension.      Palpations: Abdomen is soft.      Tenderness: There is no abdominal tenderness.   Musculoskeletal:         General: Normal range of motion.      Cervical back: Normal range of motion and neck supple.      Right lower leg: Edema (1+) present.      Left lower leg: Edema (1 +) present.   Skin:     General: Skin is warm and dry.      Capillary Refill: Capillary refill takes less than 2 seconds.   Neurological:      General: No focal deficit present.      Mental Status: He is alert.      Motor: Weakness present.   Psychiatric:         Mood and Affect: Mood normal.         Behavior: Behavior normal.         Thought Content: Thought content normal.         Judgment: Judgment normal.         Vitals:    01/10/22 1353   BP: (!) 160/109  Comment: 80/66 & 96/80   Pulse: 110   Resp: 22   Temp: 96.8 °F (36 °C)   SpO2: 97%   Weight: 95.8 kg (211 lb 3.2 oz)   PainSc: 10-Worst pain ever   PainLoc: Generalized     ECOG score: 2 (wheelchair today- patient feels weak.)         ECOG: (3) Capable of Limited Self Care, Confined to Bed or Chair Greater Than 50% of Waking Hours  Fall Risk Assessment was completed, and patient is at moderate risk for falls.  PHQ-9 Total Score:         The patient is  experiencing fatigue. Fatigue score: 6    PT/OT Functional Screening: PT fx  "screen: Pain, Difficulty Walking and Weakness  Speech Functional Screening: Speech fx screen: No needs identified  Rehab to be ordered: Rehab to be ordered: No needs identified        Result Review :   The following data was reviewed by: ELSA Ash on 01/10/2022:  Lab Results   Component Value Date    HGB 13.0 12/28/2021    HCT 39.5 12/28/2021    MCV 85.7 12/28/2021     12/28/2021    WBC 8.81 12/28/2021    NEUTROABS 5.93 12/28/2021    LYMPHSABS 1.60 12/28/2021    MONOSABS 1.07 (H) 12/28/2021    EOSABS 0.13 12/28/2021    BASOSABS 0.05 12/28/2021     Lab Results   Component Value Date    GLUCOSE 109 (H) 12/28/2021    BUN 24 (H) 12/28/2021    CREATININE 1.91 (H) 12/28/2021     12/28/2021    K 3.7 12/28/2021    CL 98 12/28/2021    CO2 23.8 12/28/2021    CALCIUM 10.1 12/28/2021    PROTEINTOT 7.3 12/28/2021    ALBUMIN 4.50 12/28/2021    BILITOT 1.2 12/28/2021    ALKPHOS 46 12/28/2021    AST 24 12/28/2021    ALT 18 12/28/2021          Assessment and Plan    Diagnoses and all orders for this visit:    1. Prostate cancer (HCC) (Primary)  -     CBC & Differential; Future  -     Comprehensive Metabolic Panel; Future  -     PSA Diagnostic; Future  -     NM Bone Scan Whole Body; Future  -     ondansetron ODT (Zofran ODT) 8 MG disintegrating tablet; Place 1 tablet on the tongue Every 8 (Eight) Hours As Needed for Nausea or Vomiting for up to 30 days.  Dispense: 60 tablet; Refill: 1    2. Dyspnea, unspecified type  -     XR Chest PA & Lateral; Future    3. Bone pain  -     NM Bone Scan Whole Body; Future    4. Nausea    Stage IV metastatic prostrate cancer with widespread bone mets from previous bone scan in August 2020. Has been on Lupron, Casodex and Xgeva. Now, with worsening bone pain with complaints of \"all over discomfort\". Hydrocodone 5/325 mg 1 tab every 4-6 hours not controlling pain.     Went to ER on 12/28/21 for the diffuse bone pain and was diagnosed with RLL pneumonia and given Levaquin " 750 mg QOD. O2 sats of 97% and lungs are clear bilaterally. Minimal lower extremity swelling. Follows with Dr. Deutsch for cardiology.     Also, with nausea present. Does not have any emesis meds at home. Will send Zofran for PRN use.     Plan for repeat pa and lateral CXR to evaluate pulmonary met and worsening of pneumonia. CBC, CMP, PSA today to evaluate for worsening prostrate cancer.     Plan for bone scan to evaluate for any worsening bone mets.     MS Contin 15 mg BID to be sent by Dr. Trevino to pharmacy. Hydrocodone for BTP. Discussed with patient and son he will take the MS Contin scheduled and decrease use of the Hydrocodone use.     Son inquires about hospice care. We discussed this and decided to wait on results of bone scan and PSA levels to discuss further. He has follow up already scheduled with Dr. Trevino on 1/17/22.     Bone scan and Pa and lateral CXR to be done at [facility].     Will call with lab results and CXR.   Called to speak with son regarding CXR results and bone scan results. Pain is improving with the addition of the MS contin BID dosing.     Son does report father is not eating very much maybe a 1/2 can of Boost daily, he reports.  They will discuss this with Dr. Trevino on Monday when they come for next follow up appointment.         Patient Follow Up: as scheduled.   Patient was given instructions and counseling regarding his condition or for health maintenance advice. Please see specific information pulled into the AVS if appropriate.     Harper Moreno, APRN    1/10/2022

## 2022-01-11 NOTE — TELEPHONE ENCOUNTER
Caller: ERNESTINA    Relationship to patient: [facility] call back number: [phone]    [phone]    PLEASE SEND ORDER FOR WHOLE BODY NUCLEAR BONE SCAN.

## 2022-01-14 NOTE — TELEPHONE ENCOUNTER
Caller: TAURUS SALINAS    Relationship: Emergency Contact    Best call back number: [phone]    What is the best time to reach you: ANYTIME - LEAVE VM IF NO ANSWER     Who are you requesting to speak with (clinical staff, provider,  specific staff member): NURSE     What was the call regarding: SON CALLED STATING THAT PATIENT HAD BEEN IN THE OFFICE ON 1/10/22 FOR AN APPT. AND WAS TOLD TO CALL BACK TO REMIND ANABELLA ABOUT PATIENT'S BONE SCAN ON 1/13/22.  HE WOULD  LIKE TO KNOW IF SHE HAS REC'D THE RESULTS OF THE BONE SCAN AND, IF SO, WOULD LIKE A CALL BACK REGARDING THE RESULTS.      Do you require a callback: YES

## 2022-01-17 NOTE — ASSESSMENT & PLAN NOTE
Patient is on leuprolide, Casodex with Xgeva for bony involvement.  PSA has slowly increased now 2.8 but his bone scan actually appears slightly better with less intensity noted in the previously identified metastatic lesions.  No new lesions identified.  He will receive leuprolide today.  Continue Casodex daily.

## 2022-01-17 NOTE — ASSESSMENT & PLAN NOTE
Patient is on MS Contin 15 mg twice daily. Despite that, he reports he is still using hydrocodone every 4 hours with incomplete pain relief. I will increase MS Contin to 30 mg twice daily. Jorge Alberto reviewed and no discrepancies. Continue hydrocodone as needed for breakthrough. Reassess next visit.

## 2022-01-17 NOTE — PROGRESS NOTES
Chief Complaint  Prostate Cancer and Follow-up    Jacob Cruz, Jacob Tom, ELSA Rivas          Hari Morillo presents to [facility] for ongoing treatment of his metastatic prostate cancer.  He is receiving leuprolide every 3 months along with daily Casodex and monthly Xgeva for bony involvement.  He reports occasional hot flashes but not terribly bothersome.  He has had a lot of issues with pain related to his bony metastatic disease.  Last week, he was started on MS Contin.  He does state that his pain is somewhat better although he is still requiring hydrocodone every 4 hours and that does not completely relieve his symptoms.  The hydrocodone does make him nauseated and he is taking Zofran as needed.  He has not been eating very well.  He has also recently had some issues with congestive heart failure requiring treatment.  Chest x-ray from last week reviewed demonstrating enlarged heart but no obvious volume overload.    Oncology/Hematology History Overview Note   5/26/2020 Prostate adenocarcinoma, Spalding score 9.      Lupron Q 3 months, Casodex and Xgeva monthly at [facility].     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 Negative for diaphoresis, fever, unexpected weight gain and unexpected weight loss.   HENT: Positive for hearing loss. Negative for mouth sores, sore throat, swollen glands, trouble swallowing and voice change.    Eyes: Negative for blurred vision.    Respiratory: Negative for cough, shortness of breath and wheezing.    Cardiovascular: Negative for chest pain and palpitations.   Gastrointestinal: Negative for abdominal pain, blood in stool, constipation, diarrhea, nausea and vomiting.   Endocrine: Negative for cold intolerance and heat intolerance.   Genitourinary: Negative for difficulty urinating, dysuria, frequency, hematuria and urinary incontinence.   Musculoskeletal: Negative for arthralgias, back pain and myalgias.   Skin: Negative for rash, skin lesions and wound.   Neurological: Positive for weakness. Negative for dizziness, seizures, numbness and headache.   Hematological: Does not bruise/bleed easily.   Psychiatric/Behavioral: Negative for depressed mood. The patient is not nervous/anxious.      Current Outpatient Medications on File Prior to Visit   Medication Sig Dispense Refill   • albuterol sulfate  (90 Base) MCG/ACT inhaler Inhale 2 puffs Every 4 (Four) Hours As Needed for Wheezing. 6.7 g 0   • aspirin 81 MG chewable tablet Chew 81 mg Daily.     • bicalutamide (CASODEX) 50 MG chemo tablet Take 1 tablet by mouth Daily. 30 tablet 5   • calcium carbonate (OS-MASSIEL) 600 MG tablet Take 600 mg by mouth 2 (two) times a day.     • cyanocobalamin (VITAMIN B-12) 1000 MCG tablet Take 1,000 mcg by mouth Daily.     • HYDROcodone-acetaminophen (NORCO) 5-325 MG per tablet Take 1 tablet by mouth Every 6 (Six) Hours As Needed (pain) for up to 30 days. 60 tablet 0   • ondansetron ODT (Zofran ODT) 8 MG disintegrating tablet Place 1 tablet on the tongue Every 8 (Eight) Hours As Needed for Nausea or Vomiting for up to 30 days. 60 tablet 1   • rosuvastatin (CRESTOR) 20 MG tablet Take 20 mg by mouth Daily.     • [DISCONTINUED] Morphine (MS CONTIN) 15 MG 12 hr tablet Take 1 tablet by mouth 2 (Two) Times a Day. 60 tablet 0     Current Facility-Administered Medications on File Prior to Visit   Medication Dose Route Frequency Provider Last Rate Last Admin   •  [COMPLETED] denosumab (XGEVA) injection 120 mg  120 mg Subcutaneous Once Ernesto Trevino MD   120 mg at 01/17/22 1340   • [COMPLETED] leuprolide (LUPRON) injection 22.5 mg  22.5 mg Intramuscular Once Ernesto Trevino MD   22.5 mg at 01/17/22 1339       No Known Allergies  Past Medical History:   Diagnosis Date   • Cardiomyopathy (HCC) 12/11/2021    (EF of 25-30% in January 2018   • CHF (congestive heart failure) (HCC) 12/06/2016   • Coronary artery disease involving coronary bypass graft of native heart without angina pectoris 12/11/2021    with previous bypass surgery x3 in October 2015 (LIMA to LAD, SVG to 2nd diagonal, and SVG to 1st marginal)   • Hyperlipidemia 12/11/2021   • Hypertension    • Hypertension, essential 12/11/2021   • Prostate cancer (HCC)    • Stage 3a chronic kidney disease ([facility]) 12/11/2021     Past Surgical History:   Procedure Laterality Date   • ARTERIAL BYPASS SURGERY     • CORONARY ARTERY BYPASS BRING BACK FOR EXPLORATION      Triple coronary bypass     Social History     Socioeconomic History   • Marital status: Single   Tobacco Use   • Smoking status: Current Every Day Smoker   • Smokeless tobacco: Current User     Types: Chew   • Tobacco comment: [place] every day   Vaping Use   • Vaping Use: Never used   Substance and Sexual Activity   • Alcohol use: Yes     Comment: VERY LITTLE   • Drug use: Never   • Sexual activity: Defer     Family History   Problem Relation Age of Onset   • Heart attack Sister        Objective   Physical Exam  Vitals reviewed. Exam conducted with a chaperone present.   Constitutional:       General: He is not in acute distress.     Appearance: Normal appearance.   Cardiovascular:      Rate and Rhythm: Normal rate and regular rhythm.      Heart sounds: Normal heart sounds. No murmur heard.  No gallop.    Pulmonary:      Effort: Pulmonary effort is normal.      Breath sounds: Normal breath sounds.   Abdominal:      General: Abdomen is flat. Bowel sounds are normal.       Palpations: Abdomen is soft.   Musculoskeletal:      Cervical back: Neck supple.      Right lower leg: No edema.      Left lower leg: No edema.   Lymphadenopathy:      Cervical: No cervical adenopathy.   Neurological:      Mental Status: He is alert and oriented to person, place, and time.   Psychiatric:         Mood and Affect: Mood normal.         Behavior: Behavior normal.         Vitals:    01/17/22 1255   BP: 92/66   Pulse: 50   Resp: 18   Temp: 96.8 °F (36 °C)   SpO2: 96%   Weight: 91 kg (200 lb 9.9 oz)   PainSc: 0-No pain   PainLoc: Generalized     ECOG score: 2 (Weak. In wheelchair.)         PHQ-9 Total Score:                    Result Review :   The following data was reviewed by: Ernesto Trevino MD on 01/17/2022:  Lab Results   Component Value Date    HGB 13.3 01/17/2022    HCT 43.3 01/17/2022    MCV 89.8 01/17/2022     01/17/2022    WBC 6.25 01/17/2022    NEUTROABS 3.99 01/17/2022    LYMPHSABS 1.17 01/17/2022    MONOSABS 0.95 (H) 01/17/2022    EOSABS 0.10 01/17/2022    BASOSABS 0.03 01/17/2022     Lab Results   Component Value Date    GLUCOSE 111 (H) 01/17/2022    BUN 21 01/17/2022    CREATININE 2.17 (H) 01/17/2022     01/17/2022    K 3.8 01/17/2022     01/17/2022    CO2 24.7 01/17/2022    CALCIUM 8.7 01/17/2022    PROTEINTOT 6.4 01/17/2022    ALBUMIN 4.33 01/17/2022    BILITOT 1.1 01/17/2022    ALKPHOS 64 01/17/2022    AST 29 01/17/2022    ALT 22 01/17/2022           Assessment and Plan    Diagnoses and all orders for this visit:    1. 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  PSA has slowly increased now 2.8 but his bone scan actually appears slightly better with less intensity noted in the previously identified metastatic lesions.  No new lesions identified.  He will receive leuprolide today.  Continue Casodex daily.      2. Cancer associated  pain  Assessment & Plan:  Patient is on MS Contin 15 mg twice daily. Despite that, he reports he is still using hydrocodone every 4 hours with incomplete pain relief. I will increase MS Contin to 30 mg twice daily. Jorge Alberto reviewed and no discrepancies. Continue hydrocodone as needed for breakthrough. Reassess next visit.    Orders:  -     Morphine (MS CONTIN) 30 MG 12 hr tablet; Take 1 tablet by mouth 2 (Two) Times a Day.  Dispense: 60 tablet; Refill: 0          Patient Follow Up: 3 months with next Lupron injection    Patient was given instructions and counseling regarding his condition or for health maintenance advice. Please see specific information pulled into the AVS if appropriate.     Ernesto Trevino MD    1/17/2022

## 2022-01-24 NOTE — TELEPHONE ENCOUNTER
Pt called stating the morphine rx was not ready at the pharmacy. I spoke with the pharmacist and they did not receive the rx sent on 1/17/22. The patient is due for his hydrocodone to be refilled too.

## 2022-01-25 NOTE — TELEPHONE ENCOUNTER
This nurse called Timothy back and informed him of the OTC medications that Dr Trevino recommends. Timothy states that he has already tried Melatonin and Tylenol PM before calling and those meds did not help. This nurse instructed Timothy to call the pt's PCP. Timothy states that the pt has not been to his PCP in over a year and that he does not believe the PCP will call in a med w/o seeing the pt. Timothy states that he is trying to keep the pt at home to limit his exposure to Covid.

## 2022-01-25 NOTE — TELEPHONE ENCOUNTER
Timothy called and states that the pt is not sleeping well at night. Timothy states that the pt is up and down all night and is not getting rest. Timothy is asking if Dr Trevino could order something that would help the pt rest/sleep at night.

## 2022-01-25 NOTE — TELEPHONE ENCOUNTER
This nurse called Magnus and made him aware of the Doxepin that Dr Trevino sent into [facility] and reviewed med directions. This nurse reviewed the risk of taking the med with pain meds.This nurse also made Magnus aware that the med is only for short use and that if the insomnia does not improve then the pt would need to see his PCP for further tx of insomnia. Timothy voiced understanding.

## 2022-02-02 ENCOUNTER — TELEPHONE (OUTPATIENT)
Dept: ONCOLOGY | Facility: HOSPITAL | Age: 82
End: 2022-02-02

## 2022-02-02 NOTE — TELEPHONE ENCOUNTER
“Please be informed that patient has passed. Patient has been marked  in the system. The date of death is: 22    Caller: TAURUS SALINAS    Relationship: Emergency Contact    Best call back number: [phone]